# Patient Record
Sex: FEMALE | Race: OTHER | HISPANIC OR LATINO | Employment: STUDENT | URBAN - METROPOLITAN AREA
[De-identification: names, ages, dates, MRNs, and addresses within clinical notes are randomized per-mention and may not be internally consistent; named-entity substitution may affect disease eponyms.]

---
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
## 2022-02-01 DIAGNOSIS — F51.01 PRIMARY INSOMNIA: ICD-10-CM

## 2022-02-01 RX ORDER — TRAZODONE HYDROCHLORIDE 50 MG/1
TABLET ORAL
Qty: 90 TABLET | Refills: 0 | Status: SHIPPED | OUTPATIENT
Start: 2022-02-01

## 2022-03-07 DIAGNOSIS — F32.89 OTHER DEPRESSION: ICD-10-CM

## 2022-03-11 RX ORDER — BUPROPION HYDROCHLORIDE 150 MG/1
TABLET ORAL
Qty: 90 TABLET | Refills: 0 | Status: SHIPPED | OUTPATIENT
Start: 2022-03-11

## 2022-03-11 NOTE — TELEPHONE ENCOUNTER
anuel chavez showed for appt 3/10/22, LM for pt to call office back to schedule follow up appt  Pt last seen 12/10/21  Insurance is requiring a #90 day script  I sent #90 with no refills

## 2022-05-22 ENCOUNTER — HOSPITAL ENCOUNTER (EMERGENCY)
Facility: HOSPITAL | Age: 22
Discharge: HOME | End: 2022-05-22
Attending: EMERGENCY MEDICINE
Payer: COMMERCIAL

## 2022-05-22 VITALS
SYSTOLIC BLOOD PRESSURE: 122 MMHG | TEMPERATURE: 98.1 F | HEART RATE: 80 BPM | RESPIRATION RATE: 16 BRPM | WEIGHT: 154.5 LBS | OXYGEN SATURATION: 100 % | BODY MASS INDEX: 26.52 KG/M2 | DIASTOLIC BLOOD PRESSURE: 72 MMHG

## 2022-05-22 DIAGNOSIS — J02.9 PHARYNGITIS, UNSPECIFIED ETIOLOGY: Primary | ICD-10-CM

## 2022-05-22 LAB
FLUAV RNA RESP QL NAA+PROBE: NEGATIVE
FLUBV RNA RESP QL NAA+PROBE: NEGATIVE
RSV RNA RESP QL NAA+PROBE: NEGATIVE
S PYO DNA THROAT QL NAA+PROBE: NOT DETECTED
SARS-COV-2 RNA RESP QL NAA+PROBE: POSITIVE

## 2022-05-22 PROCEDURE — 99283 EMERGENCY DEPT VISIT LOW MDM: CPT

## 2022-05-22 PROCEDURE — 0241U HB NFCT DS VIR RESP RNA 4 TRGT: CPT | Performed by: PHYSICIAN ASSISTANT

## 2022-05-22 PROCEDURE — 87651 STREP A DNA AMP PROBE: CPT | Performed by: PHYSICIAN ASSISTANT

## 2022-05-22 PROCEDURE — 99284 EMERGENCY DEPT VISIT MOD MDM: CPT | Performed by: EMERGENCY MEDICINE

## 2022-05-22 RX ORDER — AMOXICILLIN 500 MG/1
500 CAPSULE ORAL EVERY 12 HOURS SCHEDULED
Qty: 20 CAPSULE | Refills: 0 | Status: SHIPPED | OUTPATIENT
Start: 2022-05-22 | End: 2022-06-01

## 2022-05-22 RX ORDER — ACETAMINOPHEN 325 MG/1
650 TABLET ORAL ONCE
Status: COMPLETED | OUTPATIENT
Start: 2022-05-22 | End: 2022-05-22

## 2022-05-22 RX ADMIN — DEXAMETHASONE SODIUM PHOSPHATE 10 MG: 10 INJECTION, SOLUTION INTRAMUSCULAR; INTRAVENOUS at 20:46

## 2022-05-22 RX ADMIN — ACETAMINOPHEN 650 MG: 325 TABLET ORAL at 20:45

## 2022-05-22 NOTE — Clinical Note
Joanie Madrigal was seen and treated in our emergency department on 5/22/2022  Diagnosis:     Oral Kerr  may return to school on return date, may return to work on return date  She may return on this date: 05/24/2022         If you have any questions or concerns, please don't hesitate to call        Rich Correa RN    ______________________________           _______________          _______________  [facility] Representative                              Date                                Time

## 2022-05-23 ENCOUNTER — TELEPHONE (OUTPATIENT)
Dept: EMERGENCY DEPT | Facility: HOSPITAL | Age: 22
End: 2022-05-23

## 2022-05-23 ENCOUNTER — TELEPHONE (OUTPATIENT)
Dept: OTHER | Facility: HOSPITAL | Age: 22
End: 2022-05-23

## 2022-05-23 NOTE — RESULT ENCOUNTER NOTE
Patient aware of positive covid results  Discussed 5 days isolation  During call patient hung up or disconnected, returned call with no answer  Left message to return call with any questions

## 2022-05-24 NOTE — ED PROVIDER NOTES
HPI: Patient is a 25 y o  female who presents with 2 days of cough and sore throat which the patient describes at mild The patient has had contact with people with similar symptoms  The patient has not taken any medication  No Known Allergies    Past Medical History:   Diagnosis Date    Anemia     Asthma       History reviewed  No pertinent surgical history  Social History     Tobacco Use    Smoking status: Never Smoker    Smokeless tobacco: Never Used   Vaping Use    Vaping Use: Never used   Substance Use Topics    Alcohol use: No    Drug use: No       Nursing notes reviewed  Physical Exam:  ED Triage Vitals [05/22/22 2040]   Temperature Pulse Respirations Blood Pressure SpO2   98 1 °F (36 7 °C) 80 16 122/72 100 %      Temp Source Heart Rate Source Patient Position - Orthostatic VS BP Location FiO2 (%)   Oral Monitor Sitting Left arm --      Pain Score       8           ROS: Positive for cough and sore throat ,the remainder of a 10 organ system ROS was otherwise unremarkable  General: awake, alert, no acute distress    Head: normocephalic, atraumatic    Eyes: no scleral icterus  Ears: external ears normal, hearing grossly intact  Nose: external exam grossly normal, positive nasal discharge  Neck: symmetric, No JVD noted, trachea midline  Pulmonary: no respiratory distress, no tachypnea noted  Cardiovascular: appears well perfused  Abdomen: no distention noted  Musculoskeletal: no deformities noted, tone normal  Neuro: grossly non-focal  Psych: mood and affect appropriate    The patient is stable and has a history and physical exam consistent with a viral illness  COVID19 testing has been performed  I considered the patient's other medical conditions as applicable/noted above in my medical decision making  The patient is stable upon discharge  The plan is for supportive care at home      The patient (and any family present) verbalized understanding of the discharge instructions and warnings that would necessitate return to the Emergency Department  All questions were answered prior to discharge  Pt re-examined and evaluated after testing and treatment  Spoke with the patient and feeling improved and sxs have resolved  Will discharge home with close f/u with pcp and instructed to return to the ED if sxs worsen or continue  Pt agrees with the plan for discharge and feels comfortable to go home with proper f/u  Advised to return for worsening or additional problems  Diagnostic tests were reviewed and questions answered  Diagnosis, care plan and treatment options were discussed  The patient understand instructions and will follow up as directed  Counseling: I had a detailed discussion with the patient and/or guardian regarding: the historical points, exam findings, and any diagnostic results supporting the discharge diagnosis, lab results, radiology results, discharge instructions reviewed with patient and/or family/caregiver and understanding was verbalized  Instructions given to return to the emergency department if symptoms worsen or persist, or if there are any questions or concerns that arise at home  All labs reviewed and utilized in the medical decision making process    All radiology studies independently viewed by me and interpreted by the radiologist       Medications   acetaminophen (TYLENOL) tablet 650 mg (650 mg Oral Given 5/22/22 2045)   dexamethasone oral liquid 10 mg 1 mL (10 mg Oral Given 5/22/22 2046)     Final diagnoses:   Pharyngitis, unspecified etiology     Time reflects when diagnosis was documented in both MDM as applicable and the Disposition within this note     Time User Action Codes Description Comment    5/22/2022  8:50 PM Law Mendoza Add [J02 9] Pharyngitis, unspecified etiology       ED Disposition     ED Disposition   Discharge    Condition   Stable    Date/Time   Sun May 22, 2022  8:50 PM    [address] discharge to home/self care                 Follow-up Information     Follow up With Specialties Details Why Contact Info    Shade Castro MD Family Medicine Schedule an appointment as soon as possible for a visit  If symptoms worsen [id number] [place] [id number] [phone]          Discharge Medication List as of 5/22/2022  8:54 PM      START taking these medications    Details   amoxicillin (AMOXIL) 500 mg capsule Take 1 capsule (500 mg total) by mouth every 12 (twelve) hours for 10 days, Starting Sun 5/22/2022, Until Wed 6/1/2022, Print         CONTINUE these medications which have NOT CHANGED    Details   Albuterol Sulfate (ProAir RespiClick) 686 (90 Base) MCG/ACT AEPB Inhale 2 puffs 4 (four) times a day as needed (SOB), Starting Fri 2/5/2021, Normal      buPROPion (WELLBUTRIN XL) 150 mg 24 hr tablet TAKE 1 TABLET BY MOUTH EVERY DAY IN THE MORNING, Normal      cetirizine (ZyrTEC) 10 mg tablet Starting Fri 4/29/2016, Historical Med      clindamycin-benzoyl peroxide (BENZACLIN) gel APPLY TO BACK , SHOULDERS AND CHEST ONCE DAILY, Historical Med      ipratropium-albuterol (DUO-NEB) 0 5-2 5 mg/3 mL nebulizer solution Every 6 hours, Starting Fri 12/30/2016, Historical Med      ketoconazole (NIZORAL) 2 % shampoo APPLY TO SCALP AT LEAST THREE TIMES WEEKLY FOR 5 MINUTES THEN RINSE OFF , Historical Med      traZODone (DESYREL) 50 mg tablet TAKE 1 TABLET BY MOUTH DAILY AT BEDTIME, Normal      Ventolin  (90 Base) MCG/ACT inhaler INHALE 2 PUFFS EVERY 4 TO 6 HOURS AS NEEDED FOR WHEEZING, Normal           No discharge procedures on file      Electronically Signed by        Lee Ochoa DO  05/24/22 5151

## 2022-07-29 ENCOUNTER — APPOINTMENT (OUTPATIENT)
Dept: LAB | Age: 22
End: 2022-07-29
Payer: COMMERCIAL

## 2022-07-29 DIAGNOSIS — R79.89 ELEVATED LFTS: ICD-10-CM

## 2022-07-29 DIAGNOSIS — E53.8 VITAMIN B12 DEFICIENCY: ICD-10-CM

## 2022-07-29 DIAGNOSIS — D50.9 IRON DEFICIENCY ANEMIA, UNSPECIFIED IRON DEFICIENCY ANEMIA TYPE: ICD-10-CM

## 2022-07-29 DIAGNOSIS — E55.9 VITAMIN D INSUFFICIENCY: ICD-10-CM

## 2022-07-29 DIAGNOSIS — L65.9 HAIR LOSS: ICD-10-CM

## 2022-07-29 DIAGNOSIS — F32.89 OTHER DEPRESSION: ICD-10-CM

## 2022-07-29 LAB
25(OH)D3 SERPL-MCNC: 21.3 NG/ML (ref 30–100)
ALBUMIN SERPL BCP-MCNC: 3.7 G/DL (ref 3.5–5)
ALP SERPL-CCNC: 63 U/L (ref 46–384)
ALT SERPL W P-5'-P-CCNC: 26 U/L (ref 12–78)
ANION GAP SERPL CALCULATED.3IONS-SCNC: 5 MMOL/L (ref 4–13)
AST SERPL W P-5'-P-CCNC: 18 U/L (ref 5–45)
BASOPHILS # BLD AUTO: 0.05 THOUSANDS/ΜL (ref 0–0.1)
BASOPHILS NFR BLD AUTO: 1 % (ref 0–1)
BILIRUB SERPL-MCNC: 0.99 MG/DL (ref 0.2–1)
BUN SERPL-MCNC: 13 MG/DL (ref 5–25)
CALCIUM SERPL-MCNC: 9.3 MG/DL (ref 8.3–10.1)
CHLORIDE SERPL-SCNC: 107 MMOL/L (ref 96–108)
CO2 SERPL-SCNC: 27 MMOL/L (ref 21–32)
CREAT SERPL-MCNC: 0.82 MG/DL (ref 0.6–1.3)
EOSINOPHIL # BLD AUTO: 0.21 THOUSAND/ΜL (ref 0–0.61)
EOSINOPHIL NFR BLD AUTO: 3 % (ref 0–6)
ERYTHROCYTE [DISTWIDTH] IN BLOOD BY AUTOMATED COUNT: 15.7 % (ref 11.6–15.1)
FERRITIN SERPL-MCNC: 129 NG/ML (ref 8–388)
GFR SERPL CREATININE-BSD FRML MDRD: 104 ML/MIN/1.73SQ M
GLUCOSE SERPL-MCNC: 83 MG/DL (ref 65–140)
HCT VFR BLD AUTO: 36 % (ref 34.8–46.1)
HGB BLD-MCNC: 11.5 G/DL (ref 11.5–15.4)
IMM GRANULOCYTES # BLD AUTO: 0.03 THOUSAND/UL (ref 0–0.2)
IMM GRANULOCYTES NFR BLD AUTO: 0 % (ref 0–2)
IRON SATN MFR SERPL: 55 % (ref 15–50)
IRON SERPL-MCNC: 144 UG/DL (ref 50–170)
LYMPHOCYTES # BLD AUTO: 2.06 THOUSANDS/ΜL (ref 0.6–4.47)
LYMPHOCYTES NFR BLD AUTO: 29 % (ref 14–44)
MCH RBC QN AUTO: 23.9 PG (ref 26.8–34.3)
MCHC RBC AUTO-ENTMCNC: 31.9 G/DL (ref 31.4–37.4)
MCV RBC AUTO: 75 FL (ref 82–98)
MONOCYTES # BLD AUTO: 0.63 THOUSAND/ΜL (ref 0.17–1.22)
MONOCYTES NFR BLD AUTO: 9 % (ref 4–12)
NEUTROPHILS # BLD AUTO: 4.21 THOUSANDS/ΜL (ref 1.85–7.62)
NEUTS SEG NFR BLD AUTO: 58 % (ref 43–75)
NRBC BLD AUTO-RTO: 0 /100 WBCS
PLATELET # BLD AUTO: 428 THOUSANDS/UL (ref 149–390)
PMV BLD AUTO: 9.9 FL (ref 8.9–12.7)
POTASSIUM SERPL-SCNC: 4.2 MMOL/L (ref 3.5–5.3)
PROT SERPL-MCNC: 7.3 G/DL (ref 6.4–8.4)
RBC # BLD AUTO: 4.81 MILLION/UL (ref 3.81–5.12)
SODIUM SERPL-SCNC: 139 MMOL/L (ref 135–147)
TIBC SERPL-MCNC: 261 UG/DL (ref 250–450)
TSH SERPL DL<=0.05 MIU/L-ACNC: 1.75 UIU/ML (ref 0.45–4.5)
WBC # BLD AUTO: 7.19 THOUSAND/UL (ref 4.31–10.16)

## 2022-07-29 PROCEDURE — 84443 ASSAY THYROID STIM HORMONE: CPT

## 2022-07-29 PROCEDURE — 83540 ASSAY OF IRON: CPT

## 2022-07-29 PROCEDURE — 83550 IRON BINDING TEST: CPT

## 2022-07-29 PROCEDURE — 82306 VITAMIN D 25 HYDROXY: CPT

## 2022-07-29 PROCEDURE — 82607 VITAMIN B-12: CPT

## 2022-07-29 PROCEDURE — 85025 COMPLETE CBC W/AUTO DIFF WBC: CPT

## 2022-07-29 PROCEDURE — 82728 ASSAY OF FERRITIN: CPT

## 2022-07-29 PROCEDURE — 36415 COLL VENOUS BLD VENIPUNCTURE: CPT

## 2022-07-29 PROCEDURE — 80053 COMPREHEN METABOLIC PANEL: CPT

## 2022-07-30 LAB — VIT B12 SERPL-MCNC: 596 PG/ML (ref 100–900)

## 2022-08-18 ENCOUNTER — OFFICE VISIT (OUTPATIENT)
Dept: OBGYN CLINIC | Facility: CLINIC | Age: 22
End: 2022-08-18
Payer: COMMERCIAL

## 2022-08-18 VITALS
SYSTOLIC BLOOD PRESSURE: 118 MMHG | WEIGHT: 151.4 LBS | DIASTOLIC BLOOD PRESSURE: 62 MMHG | BODY MASS INDEX: 25.85 KG/M2 | HEIGHT: 64 IN

## 2022-08-18 DIAGNOSIS — B35.4 TINEA CORPORIS: Primary | ICD-10-CM

## 2022-08-18 DIAGNOSIS — B35.6 TINEA OF GROIN: ICD-10-CM

## 2022-08-18 PROCEDURE — 99203 OFFICE O/P NEW LOW 30 MIN: CPT | Performed by: OBSTETRICS & GYNECOLOGY

## 2022-08-18 RX ORDER — KETOCONAZOLE 20 MG/G
CREAM TOPICAL DAILY
Qty: 60 G | Refills: 0 | Status: SHIPPED | OUTPATIENT
Start: 2022-08-18 | End: 2022-09-15

## 2022-08-18 RX ORDER — ITRACONAZOLE 100 MG/1
200 CAPSULE ORAL DAILY
Qty: 56 CAPSULE | Refills: 0 | Status: SHIPPED | OUTPATIENT
Start: 2022-08-18 | End: 2022-09-15

## 2022-08-18 NOTE — PROGRESS NOTES
Gynecology   Edison Alvarez 25 y o  female MRN: [id number]    HPI:  Edison Alvarez is a 25 y o  female who presents with 3 week history of spreading bumpy itchy rash  Started after working out at [employer] in different clothing  Started in groin are and now spreading towards belly  Has not tried any OTC meds  Historical Information   Past Medical History:   Diagnosis Date    Anemia     Asthma      Past Surgical History:   Procedure Laterality Date    WISDOM TOOTH EXTRACTION       OB/GYN History:   Nulligravida  Sexually active  Condoms    Family History   Problem Relation Age of Onset    No Known Problems Mother     No Known Problems Father     Breast cancer Maternal Grandmother     Thyroid disease Maternal Grandmother     Diabetes Paternal Grandmother      Social History   Social History     Substance and Sexual Activity   Alcohol Use No     Social History     Substance and Sexual Activity   Drug Use No     Social History     Tobacco Use   Smoking Status Never Smoker   Smokeless Tobacco Never Used     E-Cigarette/Vaping    E-Cigarette Use Never User      E-Cigarette/Vaping Substances    Nicotine No     THC No     CBD No     Flavoring No     Other No     Unknown No        Meds/Allergies   Current Outpatient Medications on File Prior to Visit   Medication Sig    Albuterol Sulfate (ProAir RespiClick) 925 (90 Base) MCG/ACT AEPB Inhale 2 puffs 4 (four) times a day as needed (SOB)    buPROPion (WELLBUTRIN XL) 150 mg 24 hr tablet TAKE 1 TABLET BY MOUTH EVERY DAY IN THE MORNING    cetirizine (ZyrTEC) 10 mg tablet     ipratropium-albuterol (DUO-NEB) 0 5-2 5 mg/3 mL nebulizer solution Every 6 hours    ketoconazole (NIZORAL) 2 % shampoo APPLY TO SCALP AT LEAST THREE TIMES WEEKLY FOR 5 MINUTES THEN RINSE OFF      Ventolin  (90 Base) MCG/ACT inhaler INHALE 2 PUFFS EVERY 4 TO 6 HOURS AS NEEDED FOR WHEEZING    clindamycin-benzoyl peroxide (BENZACLIN) gel APPLY TO BACK , SHOULDERS AND CHEST ONCE DAILY (Patient not taking: Reported on 8/18/2022)    traZODone (DESYREL) 50 mg tablet TAKE 1 TABLET BY MOUTH DAILY AT BEDTIME (Patient not taking: Reported on 8/18/2022)     No current facility-administered medications on file prior to visit  Allergies   Allergen Reactions    Dog Epithelium Itching    Grass Pollen(K-O-R-T-Swt Garret) Itching       Objective   Vitals: Blood pressure 118/62, height 5' 4" (1 626 m), weight 68 7 kg (151 lb 6 4 oz), last menstrual period 08/10/2022  Physical Exam  Constitutional:       Appearance: Normal appearance  HENT:      Head: Normocephalic  Cardiovascular:      Rate and Rhythm: Normal rate and regular rhythm  Pulmonary:      Effort: Pulmonary effort is normal    Abdominal:      Palpations: Abdomen is soft  Tenderness: There is no abdominal tenderness  Musculoskeletal:         General: No swelling  Neurological:      General: No focal deficit present  Mental Status: She is alert and oriented to person, place, and time  Skin:     General: Skin is warm  Coloration: Skin is not pale  Findings: Rash present  Rash is macular and scaling  Rash is not crusting  Comments: Diffuse tinea with thickening of groin folds; extends from perineum to iliac crests bilaterally   Psychiatric:         Mood and Affect: Mood normal          Behavior: Behavior normal    Vitals reviewed  Impression/Plan:    1  Tinea corporis  - itraconazole (SPORANOX) 100 mg capsule; Take 2 capsules (200 mg total) by mouth daily for 28 days  Dispense: 56 capsule; Refill: 0  - ketoconazole (NIZORAL) 2 % cream; Apply topically daily for 28 days  Dispense: 60 g; Refill: 0    2  Tinea of groin  - itraconazole (SPORANOX) 100 mg capsule; Take 2 capsules (200 mg total) by mouth daily for 28 days  Dispense: 56 capsule;  Refill: 0  - ketoconazole (NIZORAL) 2 % cream; Apply topically daily for 28 days  Dispense: 60 g; Refill: 0    Follow up with annual exam

## 2022-09-02 ENCOUNTER — TELEPHONE (OUTPATIENT)
Dept: FAMILY MEDICINE CLINIC | Facility: CLINIC | Age: 22
End: 2022-09-02

## 2022-09-02 NOTE — TELEPHONE ENCOUNTER
----- Message from Xavi Euceda MD sent at 9/1/2022 10:44 AM EDT -----    Blood work showed low vitamin-D  Please make sure she is taking vitamin-D 2000 unit daily  She is due for  an appointment

## 2022-09-02 NOTE — TELEPHONE ENCOUNTER
I lm for pt to call for results or I did send a message through her my chart   She is to call with questions

## 2022-10-12 PROBLEM — Z00.129 ENCOUNTER FOR ROUTINE CHILD HEALTH EXAMINATION WITHOUT ABNORMAL FINDINGS: Status: RESOLVED | Noted: 2021-02-05 | Resolved: 2022-10-12

## 2022-10-12 PROBLEM — Z00.00 HEALTHCARE MAINTENANCE: Status: RESOLVED | Noted: 2020-07-30 | Resolved: 2022-10-12

## 2022-10-12 PROBLEM — J03.90 TONSILLITIS: Status: RESOLVED | Noted: 2020-12-29 | Resolved: 2022-10-12

## 2023-01-11 DIAGNOSIS — F32.89 OTHER DEPRESSION: ICD-10-CM

## 2023-01-11 RX ORDER — BUPROPION HYDROCHLORIDE 150 MG/1
150 TABLET ORAL EVERY MORNING
Qty: 30 TABLET | Refills: 0 | Status: CANCELLED | OUTPATIENT
Start: 2023-01-11

## 2023-01-12 DIAGNOSIS — F32.89 OTHER DEPRESSION: ICD-10-CM

## 2023-01-12 NOTE — TELEPHONE ENCOUNTER
[id number] pharmacy needs new script and quantity 90 for 3mo supply

## 2023-01-13 RX ORDER — BUPROPION HYDROCHLORIDE 150 MG/1
150 TABLET ORAL EVERY MORNING
Qty: 90 TABLET | Refills: 0 | OUTPATIENT
Start: 2023-01-13

## 2023-01-17 ENCOUNTER — OFFICE VISIT (OUTPATIENT)
Dept: URGENT CARE | Age: 23
End: 2023-01-17

## 2023-01-17 VITALS
HEART RATE: 78 BPM | OXYGEN SATURATION: 99 % | RESPIRATION RATE: 18 BRPM | TEMPERATURE: 98.5 F | DIASTOLIC BLOOD PRESSURE: 58 MMHG | SYSTOLIC BLOOD PRESSURE: 116 MMHG

## 2023-01-17 DIAGNOSIS — H00.011 HORDEOLUM EXTERNUM OF RIGHT UPPER EYELID: ICD-10-CM

## 2023-01-17 DIAGNOSIS — H02.843 SWELLING OF RIGHT EYELID: Primary | ICD-10-CM

## 2023-01-17 RX ORDER — ERYTHROMYCIN 5 MG/G
0.5 OINTMENT OPHTHALMIC EVERY 6 HOURS SCHEDULED
Qty: 28 G | Refills: 0 | Status: SHIPPED | OUTPATIENT
Start: 2023-01-17 | End: 2023-01-24

## 2023-01-17 NOTE — PROGRESS NOTES
[facility]        NAME: Anita James is a 23 y o  female  : 2000    MRN: [id number]  DATE: 2023  TIME: 1:24 PM    Assessment and Plan   Swelling of right eyelid [H02 843]  1  Swelling of right eyelid  erythromycin (ILOTYCIN) ophthalmic ointment      2  Hordeolum externum of right upper eyelid              Patient Instructions     Erythromycin ointment as directed  Follow up with PCP in 2-3 days  Proceed to ER if symptoms worsen  Chief Complaint     Chief Complaint   Patient presents with   • Eyelid Pain     Pt presents for right eyelid redness, swelling and painful    Started yesterday  Applying warm compress  History of Present Illness     23 y o  F  R eye pain, swelling & redness x 1 day  Denies trauma or injury  No visual disturbance  No contact lenses  No discharge or itching    Review of Systems   Review of Systems   Constitutional: Negative for chills, fatigue and fever  HENT: Negative for congestion, ear pain, facial swelling, hearing loss, rhinorrhea, sinus pressure, sneezing, sore throat and trouble swallowing  Eyes: Positive for redness  Negative for pain and visual disturbance  Respiratory: Negative for cough, chest tightness, shortness of breath and wheezing  Cardiovascular: Negative for chest pain and palpitations  Gastrointestinal: Negative for abdominal pain, diarrhea, nausea and vomiting  Genitourinary: Negative for dysuria, flank pain, hematuria and pelvic pain  Musculoskeletal: Negative for arthralgias, back pain and myalgias  Skin: Negative for color change and rash  Neurological: Negative for dizziness, seizures, syncope, weakness, light-headedness and headaches  Psychiatric/Behavioral: Negative for confusion, hallucinations and sleep disturbance  The patient is not nervous/anxious  All other systems reviewed and are negative          Current Medications       Current Outpatient Medications:   •  buPROPion (WELLBUTRIN XL) 150 mg 24 hr tablet, TAKE 1 TABLET BY MOUTH EVERY DAY IN THE MORNING, Disp: 90 tablet, Rfl: 0  •  cetirizine (ZyrTEC) 10 mg tablet, , Disp: , Rfl:   •  erythromycin (ILOTYCIN) ophthalmic ointment, Administer 0 5 inches to the right eye every 6 (six) hours for 7 days, Disp: 28 g, Rfl: 0  •  ipratropium-albuterol (DUO-NEB) 0 5-2 5 mg/3 mL nebulizer solution, Every 6 hours, Disp: , Rfl:   •  Ventolin  (90 Base) MCG/ACT inhaler, INHALE 2 PUFFS EVERY 4 TO 6 HOURS AS NEEDED FOR WHEEZING, Disp: 8 Inhaler, Rfl: 1  •  Albuterol Sulfate (ProAir RespiClick) 988 (90 Base) MCG/ACT AEPB, Inhale 2 puffs 4 (four) times a day as needed (SOB), Disp: 1 each, Rfl: 2  •  clindamycin-benzoyl peroxide (BENZACLIN) gel, APPLY TO BACK , SHOULDERS AND CHEST ONCE DAILY (Patient not taking: Reported on 8/18/2022), Disp: , Rfl:   •  ketoconazole (NIZORAL) 2 % cream, Apply topically daily for 28 days, Disp: 60 g, Rfl: 0  •  ketoconazole (NIZORAL) 2 % shampoo, APPLY TO SCALP AT LEAST THREE TIMES WEEKLY FOR 5 MINUTES THEN RINSE OFF , Disp: , Rfl:   •  traZODone (DESYREL) 50 mg tablet, TAKE 1 TABLET BY MOUTH DAILY AT BEDTIME (Patient not taking: Reported on 8/18/2022), Disp: 90 tablet, Rfl: 0    Current Allergies     Allergies as of 01/17/2023 - Reviewed 01/17/2023   Allergen Reaction Noted   • Dog epithelium Itching 08/18/2022   • Grass pollen(k-o-r-t-swt vinod) Itching 08/18/2022            The following portions of the patient's history were reviewed and updated as appropriate: allergies, current medications, past family history, past medical history, past social history, past surgical history and problem list      Past Medical History:   Diagnosis Date   • Anemia    • Asthma        Past Surgical History:   Procedure Laterality Date   • WISDOM TOOTH EXTRACTION         Family History   Problem Relation Age of Onset   • No Known Problems Mother    • No Known Problems Father    • Breast cancer Maternal Grandmother    • Thyroid disease Maternal Grandmother    • Diabetes Paternal Grandmother          Medications have been verified  Objective   /58   Pulse 78   Temp 98 5 °F (36 9 °C) (Tympanic)   Resp 18   SpO2 99%   No LMP recorded  Physical Exam     Physical Exam  Vitals reviewed  Constitutional:       General: She is not in acute distress  Appearance: Normal appearance  She is not toxic-appearing  HENT:      Head: Normocephalic  Right Ear: Tympanic membrane normal  Tympanic membrane is not erythematous or bulging  Left Ear: Tympanic membrane normal  Tympanic membrane is not erythematous or bulging  Nose: No congestion or rhinorrhea  Right Sinus: No maxillary sinus tenderness or frontal sinus tenderness  Left Sinus: No maxillary sinus tenderness or frontal sinus tenderness  Mouth/Throat:      Pharynx: Uvula midline  No oropharyngeal exudate, posterior oropharyngeal erythema or uvula swelling  Tonsils: No tonsillar exudate or tonsillar abscesses  Eyes:      General:         Right eye: Hordeolum (stye to R upper eyelid; +redness & swelling) present  No foreign body or discharge  Extraocular Movements: Extraocular movements intact  Conjunctiva/sclera: Conjunctivae normal       Right eye: Right conjunctiva is not injected  No hemorrhage  Pupils: Pupils are equal, round, and reactive to light  Cardiovascular:      Rate and Rhythm: Normal rate and regular rhythm  Pulses: Normal pulses  Heart sounds: Normal heart sounds  Pulmonary:      Effort: Pulmonary effort is normal  No tachypnea or respiratory distress  Breath sounds: Normal breath sounds and air entry  No decreased breath sounds, wheezing, rhonchi or rales  Abdominal:      General: Bowel sounds are normal       Palpations: Abdomen is soft  Tenderness: There is no abdominal tenderness  There is no right CVA tenderness or guarding  Musculoskeletal:         General: Normal range of motion  Cervical back: Normal range of motion  Lymphadenopathy:      Cervical: No cervical adenopathy  Skin:     General: Skin is warm and dry  Neurological:      General: No focal deficit present  Mental Status: She is alert  Sensory: Sensation is intact  Motor: Motor function is intact  Coordination: Coordination is intact  Gait: Gait is intact  Deep Tendon Reflexes: Reflexes are normal and symmetric

## 2023-01-24 DIAGNOSIS — F32.89 OTHER DEPRESSION: ICD-10-CM

## 2023-01-24 RX ORDER — BUPROPION HYDROCHLORIDE 150 MG/1
150 TABLET ORAL EVERY MORNING
Qty: 90 TABLET | Refills: 0 | Status: SHIPPED | OUTPATIENT
Start: 2023-01-24

## 2023-02-06 ENCOUNTER — TELEPHONE (OUTPATIENT)
Dept: OBGYN CLINIC | Facility: CLINIC | Age: 23
End: 2023-02-06

## 2023-02-06 NOTE — TELEPHONE ENCOUNTER
Patient having intermittent llq pain  States tolerable but has not felt this before  Menses are regular, LMP 1/20/23  Explained mid cycle, could be mittleschmerz/ovarian cyst  Advised Motrin for now  If pain increases to call back  Offered earlier appt/outpatient US but patient states she can wait until Annual exam on Wednesday  Rtd to [facility] as FYI

## 2023-02-06 NOTE — TELEPHONE ENCOUNTER
Pt is noticing sharp pains off and on on her left lower side  She has her annual on Wednesday w/Dr Fuentes Hensley ; was offered an appt for Tuesday morning but can't come then  Please advise

## 2023-02-08 ENCOUNTER — ANNUAL EXAM (OUTPATIENT)
Dept: OBGYN CLINIC | Facility: CLINIC | Age: 23
End: 2023-02-08

## 2023-02-08 ENCOUNTER — TELEPHONE (OUTPATIENT)
Dept: OBGYN CLINIC | Facility: CLINIC | Age: 23
End: 2023-02-08

## 2023-02-08 VITALS
DIASTOLIC BLOOD PRESSURE: 62 MMHG | HEIGHT: 64 IN | SYSTOLIC BLOOD PRESSURE: 122 MMHG | WEIGHT: 156 LBS | BODY MASS INDEX: 26.63 KG/M2

## 2023-02-08 DIAGNOSIS — Z01.419 ENCOUNTER FOR ANNUAL ROUTINE GYNECOLOGICAL EXAMINATION: Primary | ICD-10-CM

## 2023-02-08 DIAGNOSIS — Z30.015 ENCOUNTER FOR INITIAL PRESCRIPTION OF VAGINAL RING HORMONAL CONTRACEPTIVE: ICD-10-CM

## 2023-02-08 RX ORDER — ETONOGESTREL AND ETHINYL ESTRADIOL 11.7; 2.7 MG/1; MG/1
INSERT, EXTENDED RELEASE VAGINAL
Qty: 3 EACH | Refills: 3 | Status: SHIPPED | OUTPATIENT
Start: 2023-02-08 | End: 2023-02-10

## 2023-02-08 NOTE — PROGRESS NOTES
Assessment/Plan:    1  Encounter for annual routine gynecological examination      2  Encounter for initial prescription of vaginal ring hormonal contraceptive    - etonogestrel-ethinyl estradiol (NuvaRing) 0 12-0 015 MG/24HR vaginal ring; Insert vaginally and leave in place for 3 consecutive weeks, then remove for 1 week  Dispense: 3 each; Refill: 3        Subjective      Kalia Katlyn is a 23 y o  female who presents for annual exam  Periods are regular every 30 days, lasting 4-7 days  Dysmenorrhea:severe, occurring first 1-2 days of flow  Cyclic symptoms:  none  No intermenstrual bleeding, spotting, or discharge  The patient would like to start birth control  Current contraception: none  History of abnormal Pap smear: no  Regular self breast exam: yes  History of abnormal mammogram: no  History of abnormal lipids: no    Menstrual History:  Nulligravida  Patient's last menstrual period was 01/20/2023 (exact date)  Period Cycle (Days): 30  Period Duration (Days): 4-7  Period Pattern: Regular  Menstrual Flow: Heavy, Light (2 days heavy)  Menstrual Control: Tampon, Maxi pad  Menstrual Control Change Freq (Hours): 5  Dysmenorrhea: (!) Severe  Dysmenorrhea Symptoms: Cramping, Headache, Diarrhea (Breast pain)    Past Medical History:   Diagnosis Date   • Anemia    • Asthma        Family History   Problem Relation Age of Onset   • No Known Problems Mother    • No Known Problems Father    • Breast cancer Maternal Grandmother    • Thyroid disease Maternal Grandmother    • Diabetes Paternal Grandmother        The following portions of the patient's history were reviewed and updated as appropriate: allergies, current medications, past family history, past medical history, past social history, past surgical history and problem list     Review of Systems  Pertinent items are noted in HPI        Objective      /62 (BP Location: Right arm, Patient Position: Sitting, Cuff Size: Large)   Ht 5' 4" (1 626 m)   Wt 70 8 kg (156 lb)   LMP 01/20/2023 (Exact Date)   BMI 26 78 kg/m²     General:   alert and oriented, in no acute distress, appears stated age and cooperative   Heart:  Breasts: regular rate and rhythm   appear normal, no suspicious masses, no skin or nipple changes or axillary nodes     Lungs: effort normal   Abdomen: soft, non-tender, without masses or organomegaly   Vulva: normal   Vagina: normal mucosa   Cervix: no lesions   Uterus: normal size, mobile, non-tender   Adnexa: normal adnexa and no mass, fullness, tenderness

## 2023-02-08 NOTE — TELEPHONE ENCOUNTER
Patient was seen today and was prescribed birth control  Patient stated it is not covered by her insurance can another birth control be prescribed

## 2023-02-10 RX ORDER — ETONOGESTREL AND ETHINYL ESTRADIOL 11.7; 2.7 MG/1; MG/1
INSERT, EXTENDED RELEASE VAGINAL
Qty: 3 EACH | Refills: 3 | Status: SHIPPED | OUTPATIENT
Start: 2023-02-10

## 2023-03-06 ENCOUNTER — OFFICE VISIT (OUTPATIENT)
Dept: FAMILY MEDICINE CLINIC | Facility: CLINIC | Age: 23
End: 2023-03-06

## 2023-03-06 VITALS
BODY MASS INDEX: 26.8 KG/M2 | OXYGEN SATURATION: 99 % | DIASTOLIC BLOOD PRESSURE: 76 MMHG | TEMPERATURE: 98.2 F | SYSTOLIC BLOOD PRESSURE: 128 MMHG | WEIGHT: 157 LBS | RESPIRATION RATE: 16 BRPM | HEIGHT: 64 IN | HEART RATE: 77 BPM

## 2023-03-06 DIAGNOSIS — F32.89 OTHER DEPRESSION: ICD-10-CM

## 2023-03-06 DIAGNOSIS — L65.9 HAIR LOSS: Primary | ICD-10-CM

## 2023-03-06 DIAGNOSIS — E55.9 VITAMIN D INSUFFICIENCY: ICD-10-CM

## 2023-03-06 DIAGNOSIS — Z00.00 WELLNESS EXAMINATION: ICD-10-CM

## 2023-03-06 DIAGNOSIS — J45.20 MILD INTERMITTENT ASTHMA WITHOUT COMPLICATION: ICD-10-CM

## 2023-03-06 DIAGNOSIS — D50.8 OTHER IRON DEFICIENCY ANEMIA: ICD-10-CM

## 2023-03-06 RX ORDER — BUPROPION HYDROCHLORIDE 300 MG/1
300 TABLET ORAL EVERY MORNING
Qty: 90 TABLET | Refills: 1 | Status: SHIPPED | OUTPATIENT
Start: 2023-03-06

## 2023-03-06 NOTE — PROGRESS NOTES
Name: Jeanette Davis      : 2000      MRN: [id number]  Encounter Provider: Bernie Hardy MD  Encounter Date: 3/6/2023   Encounter department: [address] [place] [address]  Hair loss  Assessment & Plan:  I am going to check a blood work  Continue to follow-up with dermatology  Orders:  -     CBC and differential; Future  -     TSH, 3rd generation with Free T4 reflex; Future  -     Comprehensive metabolic panel; Future    2  Other depression  Assessment & Plan:  Not well controlled I am going to increase Wellbutrin to 300 mg  Discussed about possible side effect  She was told to come back in 4 to 6 weeks and I will consider adding another medication or switching her meds  Orders:  -     buPROPion (WELLBUTRIN XL) 300 mg 24 hr tablet; Take 1 tablet (300 mg total) by mouth every morning    3  Mild intermittent asthma without complication  Assessment & Plan:  Stable and well controlled with occasional Albuterol use  Continue same  4  Wellness examination  Assessment & Plan:  Discussed regular exercise, diet, and immunizations  5  Vitamin D insufficiency  Assessment & Plan:  Continue to take vitamin D  I will check vitamin D levels    Orders:  -     Vitamin D 25 hydroxy; Future    6  Other iron deficiency anemia  Assessment & Plan:  Last blood work was last year and it showed hemoglobin and iron improving  I will repeat her CBC and iron panel  We will continue to monitor  Orders:  -     CBC and differential; Future  -     TSH, 3rd generation with Free T4 reflex; Future  -     Iron Panel (Includes Ferritin, Iron Sat%, Iron, and TIBC); Future    7  BMI 26 0-26 9,adult           Subjective     Ree Diaz is a 23year old female with PMH of anemia, asthma, and depression, presenting for annual wellness exam  She reports that she is doing well over all, but is struggling with her depression   She feels that Wellbutrin isn't working and may want to try something different  She denies any fever, SOB, chest pain, or N/V/D/ Her asthma is well controlled with occasional PRN albuterol  She has no other complaints  Review of Systems   Constitutional: Negative for activity change, chills, fatigue and fever  Respiratory: Negative for shortness of breath  Cardiovascular: Negative for chest pain, palpitations and leg swelling  Gastrointestinal: Negative for abdominal pain  Musculoskeletal: Positive for back pain  Occasional     Neurological: Negative for dizziness and weakness  Psychiatric/Behavioral: Positive for sleep disturbance         Past Medical History:   Diagnosis Date   • Anemia    • Asthma      Past Surgical History:   Procedure Laterality Date   • WISDOM TOOTH EXTRACTION       Family History   Problem Relation Age of Onset   • No Known Problems Mother    • No Known Problems Father    • Breast cancer Maternal Grandmother    • Thyroid disease Maternal Grandmother    • Diabetes Paternal Grandmother      Social History     Socioeconomic History   • Marital status: Single     Spouse name: None   • Number of children: None   • Years of education: None   • Highest education level: None   Occupational History   • None   Tobacco Use   • Smoking status: Never   • Smokeless tobacco: Never   Vaping Use   • Vaping Use: Never used   Substance and Sexual Activity   • Alcohol use: No   • Drug use: No   • Sexual activity: Yes     Partners: Male   Other Topics Concern   • None   Social History Narrative   • None     Social Determinants of Health     Financial Resource Strain: Not on file   Food Insecurity: Not on file   Transportation Needs: Not on file   Physical Activity: Not on file   Stress: Not on file   Social Connections: Not on file   Intimate Partner Violence: Not on file   Housing Stability: Not on file     Current Outpatient Medications on File Prior to Visit   Medication Sig   • Albuterol Sulfate (ProAir RespiClick) 318 (90 Base) MCG/ACT AEPB Inhale 2 puffs 4 (four) times a day as needed (SOB)   • cetirizine (ZyrTEC) 10 mg tablet    • ipratropium-albuterol (DUO-NEB) 0 5-2 5 mg/3 mL nebulizer solution Every 6 hours   • ketoconazole (NIZORAL) 2 % shampoo APPLY TO SCALP AT LEAST THREE TIMES WEEKLY FOR 5 MINUTES THEN RINSE OFF  • Ventolin  (90 Base) MCG/ACT inhaler INHALE 2 PUFFS EVERY 4 TO 6 HOURS AS NEEDED FOR WHEEZING   • [DISCONTINUED] buPROPion (WELLBUTRIN XL) 150 mg 24 hr tablet Take 1 tablet (150 mg total) by mouth every morning   • ketoconazole (NIZORAL) 2 % cream Apply topically daily for 28 days (Patient not taking: Reported on 3/6/2023)   • [DISCONTINUED] etonogestrel-ethinyl estradiol (NuvaRing) 0 12-0 015 MG/24HR vaginal ring Insert vaginally and leave in place for 3 consecutive weeks, then remove for 1 week   (Patient not taking: Reported on 3/6/2023)     Allergies   Allergen Reactions   • Dog Epithelium Itching   • Grass Pollen(K-O-R-T-Swt Garret) Itching     Immunization History   Administered Date(s) Administered   • COVID-19 MODERNA VACC 0 5 ML IM 05/03/2021, 06/02/2021   • DTaP 01/10/2003, 03/07/2003, 05/05/2003, 02/09/2004, 11/15/2006   • HPV9 10/24/2019, 01/03/2020, 02/05/2021   • Hep A, ped/adol, 2 dose 10/24/2019, 04/27/2020   • Hep B, Adolescent or Pediatric 01/10/2003, 03/07/2003, 02/09/2004   • Hepatitis A 10/24/2019, 04/27/2020   • HiB 01/10/2003, 03/07/2003, 12/09/2003   • Hib (PRP-T) 01/10/2003   • INFLUENZA 12/03/2007, 01/08/2008, 01/24/2011, 03/14/2012, 11/19/2012   • IPV 01/10/2003, 03/07/2003, 12/09/2003, 11/15/2006   • Influenza Split High Dose Preservative Free IM 12/09/2003   • MMR 12/09/2003, 11/15/2006, 11/12/2015   • Meningococcal Conjugate (MCV4O) 12/29/2014, 11/21/2018   • Meningococcal MCV4P 11/21/2018   • Pneumococcal Conjugate 13-Valent 03/07/2003, 05/05/2003   • Pneumococcal Conjugate PCV 7 01/10/2003, 03/07/2003, 05/05/2003   • Pneumococcal Polysaccharide PPV23 01/10/2003   • Tdap 12/29/2014   • Tuberculin Skin Test-PPD Intradermal 11/21/2018   • Varicella 12/09/2003, 12/03/2007, 11/12/2015       Objective     /76 (BP Location: Left arm, Patient Position: Sitting, Cuff Size: Adult)   Pulse 77   Temp 98 2 °F (36 8 °C) (Tympanic)   Resp 16   Ht 5' 4" (1 626 m)   Wt 71 2 kg (157 lb)   SpO2 99%   BMI 26 95 kg/m²     Physical Exam  Vitals and nursing note reviewed  Constitutional:       Appearance: Normal appearance  She is normal weight  HENT:      Head: Normocephalic and atraumatic  Right Ear: Tympanic membrane normal       Left Ear: Tympanic membrane normal       Nose: Nose normal       Mouth/Throat:      Mouth: Mucous membranes are moist    Eyes:      Pupils: Pupils are equal, round, and reactive to light  Cardiovascular:      Rate and Rhythm: Normal rate and regular rhythm  Pulses: Normal pulses  Heart sounds: Normal heart sounds  Pulmonary:      Effort: Pulmonary effort is normal       Breath sounds: Normal breath sounds  Abdominal:      General: Abdomen is flat  Skin:     General: Skin is warm and dry  Capillary Refill: Capillary refill takes less than 2 seconds  Neurological:      General: No focal deficit present  Mental Status: She is alert     Psychiatric:         Mood and Affect: Mood normal        Leonor Lamb MD

## 2023-03-06 NOTE — PROGRESS NOTES
Name: Jae Morales      : 2000      MRN: [id number]  Encounter Provider: Edgar Stephens MD  Encounter Date: 3/6/2023   Encounter department: [address] [place] [address]  Hair loss  Assessment & Plan:  I am going to check a blood work  Continue to follow-up with dermatology  Orders:  -     CBC and differential; Future  -     TSH, 3rd generation with Free T4 reflex; Future  -     Comprehensive metabolic panel; Future    2  Other depression  Assessment & Plan:  Not well controlled I am going to increase Wellbutrin to 300 mg  Discussed about possible side effect  She was told to come back in 4 to 6 weeks and I will consider adding another medication or switching her meds  Orders:  -     buPROPion (WELLBUTRIN XL) 300 mg 24 hr tablet; Take 1 tablet (300 mg total) by mouth every morning    3  Mild intermittent asthma without complication  Assessment & Plan:  Stable and well controlled with occasional Albuterol use  Continue same  4  Wellness examination  Assessment & Plan:  Discussed regular exercise, diet, and immunizations  5  Vitamin D insufficiency  Assessment & Plan:  Continue to take vitamin D  I will check vitamin D levels    Orders:  -     Vitamin D 25 hydroxy; Future    6  Other iron deficiency anemia  Assessment & Plan:  Last blood work was last year and it showed hemoglobin and iron improving  I will repeat her CBC and iron panel  We will continue to monitor  Orders:  -     CBC and differential; Future  -     TSH, 3rd generation with Free T4 reflex; Future  -     Iron Panel (Includes Ferritin, Iron Sat%, Iron, and TIBC); Future    7  BMI 26 0-26 9,adult      BMI Counseling: Body mass index is 26 95 kg/m²  The BMI is above normal  Nutrition recommendations include decreasing portion sizes, encouraging healthy choices of fruits and vegetables and decreasing fast food intake   Rationale for BMI follow-up plan is due to patient being overweight or obese  Sundar Judd is a 23year old female with PMH of anemia, asthma, and depression, presenting for annual wellness exam  She reports that she is doing well over all, but is struggling with her depression  She feels that Wellbutrin isn't working and may want to try something different  She denies any fever, SOB, chest pain, or N/V/D/ Her asthma is well controlled with occasional PRN albuterol  She has no other complaints  Review of Systems   Constitutional: Negative for activity change, chills, fatigue and fever  Respiratory: Negative for shortness of breath  Cardiovascular: Negative for chest pain, palpitations and leg swelling  Gastrointestinal: Negative for abdominal pain  Musculoskeletal: Positive for back pain  Occasional     Neurological: Negative for dizziness and weakness  Psychiatric/Behavioral: Positive for sleep disturbance         Past Medical History:   Diagnosis Date   • Anemia    • Asthma      Past Surgical History:   Procedure Laterality Date   • WISDOM TOOTH EXTRACTION       Family History   Problem Relation Age of Onset   • No Known Problems Mother    • No Known Problems Father    • Breast cancer Maternal Grandmother    • Thyroid disease Maternal Grandmother    • Diabetes Paternal Grandmother      Social History     Socioeconomic History   • Marital status: Single     Spouse name: None   • Number of children: None   • Years of education: None   • Highest education level: None   Occupational History   • None   Tobacco Use   • Smoking status: Never   • Smokeless tobacco: Never   Vaping Use   • Vaping Use: Never used   Substance and Sexual Activity   • Alcohol use: No   • Drug use: No   • Sexual activity: Yes     Partners: Male   Other Topics Concern   • None   Social History Narrative   • None     Social Determinants of Health     Financial Resource Strain: Not on file   Food Insecurity: Not on file   Transportation Needs: Not on file   Physical Activity: Not on file   Stress: Not on file   Social Connections: Not on file   Intimate Partner Violence: Not on file   Housing Stability: Not on file     Current Outpatient Medications on File Prior to Visit   Medication Sig   • Albuterol Sulfate (ProAir RespiClick) 138 (90 Base) MCG/ACT AEPB Inhale 2 puffs 4 (four) times a day as needed (SOB)   • cetirizine (ZyrTEC) 10 mg tablet    • ipratropium-albuterol (DUO-NEB) 0 5-2 5 mg/3 mL nebulizer solution Every 6 hours   • ketoconazole (NIZORAL) 2 % shampoo APPLY TO SCALP AT LEAST THREE TIMES WEEKLY FOR 5 MINUTES THEN RINSE OFF  • Ventolin  (90 Base) MCG/ACT inhaler INHALE 2 PUFFS EVERY 4 TO 6 HOURS AS NEEDED FOR WHEEZING   • [DISCONTINUED] buPROPion (WELLBUTRIN XL) 150 mg 24 hr tablet Take 1 tablet (150 mg total) by mouth every morning   • ketoconazole (NIZORAL) 2 % cream Apply topically daily for 28 days (Patient not taking: Reported on 3/6/2023)   • [DISCONTINUED] etonogestrel-ethinyl estradiol (NuvaRing) 0 12-0 015 MG/24HR vaginal ring Insert vaginally and leave in place for 3 consecutive weeks, then remove for 1 week   (Patient not taking: Reported on 3/6/2023)     Allergies   Allergen Reactions   • Dog Epithelium Itching   • Grass Pollen(K-O-R-T-Swt Garret) Itching     Immunization History   Administered Date(s) Administered   • COVID-19 MODERNA VACC 0 5 ML IM 05/03/2021, 06/02/2021   • DTaP 01/10/2003, 03/07/2003, 05/05/2003, 02/09/2004, 11/15/2006   • HPV9 10/24/2019, 01/03/2020, 02/05/2021   • Hep A, ped/adol, 2 dose 10/24/2019, 04/27/2020   • Hep B, Adolescent or Pediatric 01/10/2003, 03/07/2003, 02/09/2004   • Hepatitis A 10/24/2019, 04/27/2020   • HiB 01/10/2003, 03/07/2003, 12/09/2003   • Hib (PRP-T) 01/10/2003   • INFLUENZA 12/03/2007, 01/08/2008, 01/24/2011, 03/14/2012, 11/19/2012   • IPV 01/10/2003, 03/07/2003, 12/09/2003, 11/15/2006   • Influenza Split High Dose Preservative Free IM 12/09/2003   • MMR 12/09/2003, 11/15/2006, 11/12/2015   • Meningococcal Conjugate (MCV4O) 12/29/2014, 11/21/2018   • Meningococcal MCV4P 11/21/2018   • Pneumococcal Conjugate 13-Valent 03/07/2003, 05/05/2003   • Pneumococcal Conjugate PCV 7 01/10/2003, 03/07/2003, 05/05/2003   • Pneumococcal Polysaccharide PPV23 01/10/2003   • Tdap 12/29/2014   • Tuberculin Skin Test-PPD Intradermal 11/21/2018   • Varicella 12/09/2003, 12/03/2007, 11/12/2015       Objective     /76 (BP Location: Left arm, Patient Position: Sitting, Cuff Size: Adult)   Pulse 77   Temp 98 2 °F (36 8 °C) (Tympanic)   Resp 16   Ht 5' 4" (1 626 m)   Wt 71 2 kg (157 lb)   SpO2 99%   BMI 26 95 kg/m²     Physical Exam  Vitals and nursing note reviewed  Constitutional:       Appearance: Normal appearance  She is normal weight  HENT:      Head: Normocephalic and atraumatic  Right Ear: Tympanic membrane normal       Left Ear: Tympanic membrane normal       Nose: Nose normal       Mouth/Throat:      Mouth: Mucous membranes are moist    Eyes:      Pupils: Pupils are equal, round, and reactive to light  Cardiovascular:      Rate and Rhythm: Normal rate and regular rhythm  Pulses: Normal pulses  Heart sounds: Normal heart sounds  Pulmonary:      Effort: Pulmonary effort is normal       Breath sounds: Normal breath sounds  Abdominal:      General: Abdomen is flat  Skin:     General: Skin is warm and dry  Capillary Refill: Capillary refill takes less than 2 seconds  Neurological:      General: No focal deficit present  Mental Status: She is alert  Psychiatric:         Mood and Affect: Mood normal        Alivia Mcdonald MD BMI Counseling: Body mass index is 26 95 kg/m²  The BMI is above normal  Nutrition recommendations include reducing portion sizes, decreasing overall calorie intake and 3-5 servings of fruits/vegetables daily  Exercise recommendations include moderate aerobic physical activity for 150 minutes/week

## 2023-03-06 NOTE — ASSESSMENT & PLAN NOTE
Last blood work was last year and it showed hemoglobin and iron improving  I will repeat her CBC and iron panel  We will continue to monitor

## 2023-03-06 NOTE — ASSESSMENT & PLAN NOTE
Not well controlled I am going to increase Wellbutrin to 300 mg  Discussed about possible side effect  She was told to come back in 4 to 6 weeks and I will consider adding another medication or switching her meds

## 2023-03-08 ENCOUNTER — APPOINTMENT (OUTPATIENT)
Dept: LAB | Age: 23
End: 2023-03-08

## 2023-03-08 DIAGNOSIS — D50.8 OTHER IRON DEFICIENCY ANEMIA: ICD-10-CM

## 2023-03-08 DIAGNOSIS — L65.9 HAIR LOSS: ICD-10-CM

## 2023-03-08 DIAGNOSIS — E55.9 VITAMIN D INSUFFICIENCY: ICD-10-CM

## 2023-03-08 LAB
25(OH)D3 SERPL-MCNC: 16 NG/ML (ref 30–100)
ALBUMIN SERPL BCP-MCNC: 4 G/DL (ref 3.5–5)
ALP SERPL-CCNC: 58 U/L (ref 46–116)
ALT SERPL W P-5'-P-CCNC: 22 U/L (ref 12–78)
ANION GAP SERPL CALCULATED.3IONS-SCNC: 5 MMOL/L (ref 4–13)
AST SERPL W P-5'-P-CCNC: 17 U/L (ref 5–45)
BASOPHILS # BLD AUTO: 0.07 THOUSANDS/ÂΜL (ref 0–0.1)
BASOPHILS NFR BLD AUTO: 1 % (ref 0–1)
BILIRUB SERPL-MCNC: 1.01 MG/DL (ref 0.2–1)
BUN SERPL-MCNC: 10 MG/DL (ref 5–25)
CALCIUM SERPL-MCNC: 9.1 MG/DL (ref 8.3–10.1)
CHLORIDE SERPL-SCNC: 107 MMOL/L (ref 96–108)
CO2 SERPL-SCNC: 24 MMOL/L (ref 21–32)
CREAT SERPL-MCNC: 0.77 MG/DL (ref 0.6–1.3)
EOSINOPHIL # BLD AUTO: 0.42 THOUSAND/ÂΜL (ref 0–0.61)
EOSINOPHIL NFR BLD AUTO: 5 % (ref 0–6)
ERYTHROCYTE [DISTWIDTH] IN BLOOD BY AUTOMATED COUNT: 15 % (ref 11.6–15.1)
FERRITIN SERPL-MCNC: 132 NG/ML (ref 8–388)
GFR SERPL CREATININE-BSD FRML MDRD: 111 ML/MIN/1.73SQ M
GLUCOSE P FAST SERPL-MCNC: 89 MG/DL (ref 65–99)
HCT VFR BLD AUTO: 37.8 % (ref 34.8–46.1)
HGB BLD-MCNC: 12.1 G/DL (ref 11.5–15.4)
IMM GRANULOCYTES # BLD AUTO: 0.03 THOUSAND/UL (ref 0–0.2)
IMM GRANULOCYTES NFR BLD AUTO: 0 % (ref 0–2)
IRON SATN MFR SERPL: 65 % (ref 15–50)
IRON SERPL-MCNC: 185 UG/DL (ref 50–170)
LYMPHOCYTES # BLD AUTO: 1.73 THOUSANDS/ÂΜL (ref 0.6–4.47)
LYMPHOCYTES NFR BLD AUTO: 19 % (ref 14–44)
MCH RBC QN AUTO: 23.7 PG (ref 26.8–34.3)
MCHC RBC AUTO-ENTMCNC: 32 G/DL (ref 31.4–37.4)
MCV RBC AUTO: 74 FL (ref 82–98)
MONOCYTES # BLD AUTO: 0.6 THOUSAND/ÂΜL (ref 0.17–1.22)
MONOCYTES NFR BLD AUTO: 7 % (ref 4–12)
NEUTROPHILS # BLD AUTO: 6.29 THOUSANDS/ÂΜL (ref 1.85–7.62)
NEUTS SEG NFR BLD AUTO: 68 % (ref 43–75)
NRBC BLD AUTO-RTO: 0 /100 WBCS
PLATELET # BLD AUTO: 372 THOUSANDS/UL (ref 149–390)
PMV BLD AUTO: 10.1 FL (ref 8.9–12.7)
POTASSIUM SERPL-SCNC: 3.8 MMOL/L (ref 3.5–5.3)
PROT SERPL-MCNC: 7.2 G/DL (ref 6.4–8.4)
RBC # BLD AUTO: 5.11 MILLION/UL (ref 3.81–5.12)
SODIUM SERPL-SCNC: 136 MMOL/L (ref 135–147)
TIBC SERPL-MCNC: 284 UG/DL (ref 250–450)
TSH SERPL DL<=0.05 MIU/L-ACNC: 1.05 UIU/ML (ref 0.45–4.5)
WBC # BLD AUTO: 9.14 THOUSAND/UL (ref 4.31–10.16)

## 2023-03-20 ENCOUNTER — TELEPHONE (OUTPATIENT)
Dept: FAMILY MEDICINE CLINIC | Facility: CLINIC | Age: 23
End: 2023-03-20

## 2023-03-20 DIAGNOSIS — E55.9 VITAMIN D DEFICIENCY: Primary | ICD-10-CM

## 2023-03-20 NOTE — TELEPHONE ENCOUNTER
----- Message from Henrene Libman, MD sent at 3/19/2023 10:06 AM EDT -----  Blood work showed low vitamin D   I recommend vitamin D 50,000 units weekly  All the rest of blood work came back stable

## 2023-03-21 RX ORDER — ERGOCALCIFEROL 1.25 MG/1
50000 CAPSULE ORAL WEEKLY
Qty: 12 CAPSULE | Refills: 1 | Status: SHIPPED | OUTPATIENT
Start: 2023-03-21

## 2023-03-21 NOTE — TELEPHONE ENCOUNTER
I sent rx to pharmacy for vitamin d and sent her a message through her my chart regarding results and the new rx that was sent   Pt is to call with any questions

## 2023-03-23 ENCOUNTER — TELEPHONE (OUTPATIENT)
Dept: FAMILY MEDICINE CLINIC | Facility: CLINIC | Age: 23
End: 2023-03-23

## 2023-03-23 NOTE — TELEPHONE ENCOUNTER
Spoke to mother and advised that she can check My Chart with results and recommendations  Advised her about prescription at pharmacy for vitamin D      Patient also wanted to know if there is anything Dr Emani Ibrahim can recommend for patient's hair loss  Went to dermatology and none of his treatments have worked

## 2023-03-24 NOTE — TELEPHONE ENCOUNTER
Pt aware she can use Biotin for hair loss  Pt states she tried the Biotin in the past and it did not work  Any additional recommendations?

## 2023-03-24 NOTE — TELEPHONE ENCOUNTER
Unfortunately I dont have any other recommendations and all of her labs are stable  She could try different dermatologist for other recommendations

## 2023-07-19 ENCOUNTER — TELEPHONE (OUTPATIENT)
Dept: FAMILY MEDICINE CLINIC | Facility: CLINIC | Age: 23
End: 2023-07-19

## 2023-07-19 DIAGNOSIS — F32.89 OTHER DEPRESSION: ICD-10-CM

## 2023-07-19 DIAGNOSIS — E55.9 VITAMIN D DEFICIENCY: ICD-10-CM

## 2023-07-19 RX ORDER — ERGOCALCIFEROL 1.25 MG/1
50000 CAPSULE ORAL WEEKLY
Qty: 12 CAPSULE | Refills: 0 | Status: SHIPPED | OUTPATIENT
Start: 2023-07-19

## 2023-07-19 RX ORDER — BUPROPION HYDROCHLORIDE 300 MG/1
300 TABLET ORAL EVERY MORNING
Qty: 90 TABLET | Refills: 0 | Status: SHIPPED | OUTPATIENT
Start: 2023-07-19

## 2023-07-19 NOTE — TELEPHONE ENCOUNTER
Patient said she went to refill her Bupropion and vitamin D and was told that they are not covered by her insurance and will need an authorization

## 2023-07-19 NOTE — TELEPHONE ENCOUNTER
I called [facility]. Pt filled Bupropion on 6/19/23 with a copay card. He was not sure what medication needed a prior auth of any. He states Vit D is not covered by insurance because it is a vitamin. The pharmacist will check if any [place] 's are needed and call back. I spoke with pt and she states her insurance told her she needs to get her Wellbutrin through mail order and her Vit D is not covered. Wellbutrin and Vit D sent to [facility] mail order pharmacy per pt's request. Pt also due for appt. sched appt for this Friday.

## 2023-08-08 ENCOUNTER — TELEPHONE (OUTPATIENT)
Dept: FAMILY MEDICINE CLINIC | Facility: CLINIC | Age: 23
End: 2023-08-08

## 2023-08-08 DIAGNOSIS — F32.89 OTHER DEPRESSION: ICD-10-CM

## 2023-08-08 RX ORDER — BUPROPION HYDROCHLORIDE 300 MG/1
300 TABLET ORAL EVERY MORNING
Qty: 7 TABLET | Refills: 0 | Status: SHIPPED | OUTPATIENT
Start: 2023-08-08

## 2023-08-08 NOTE — TELEPHONE ENCOUNTER
Message from [facility] asking for a small supply of Bupropion to hold the patient over until her mail-away prescription arrives

## 2023-08-15 ENCOUNTER — OFFICE VISIT (OUTPATIENT)
Dept: URGENT CARE | Age: 23
End: 2023-08-15
Payer: COMMERCIAL

## 2023-08-15 ENCOUNTER — APPOINTMENT (OUTPATIENT)
Dept: RADIOLOGY | Age: 23
End: 2023-08-15
Payer: COMMERCIAL

## 2023-08-15 VITALS
HEART RATE: 112 BPM | SYSTOLIC BLOOD PRESSURE: 108 MMHG | OXYGEN SATURATION: 98 % | TEMPERATURE: 102 F | RESPIRATION RATE: 18 BRPM | DIASTOLIC BLOOD PRESSURE: 66 MMHG

## 2023-08-15 DIAGNOSIS — J06.9 VIRAL URI: ICD-10-CM

## 2023-08-15 DIAGNOSIS — R06.02 SHORTNESS OF BREATH: ICD-10-CM

## 2023-08-15 DIAGNOSIS — J45.901 ASTHMA WITH ACUTE EXACERBATION, UNSPECIFIED ASTHMA SEVERITY, UNSPECIFIED WHETHER PERSISTENT: ICD-10-CM

## 2023-08-15 DIAGNOSIS — R01.1 MURMUR, CARDIAC: ICD-10-CM

## 2023-08-15 DIAGNOSIS — R11.2 NAUSEA AND VOMITING, UNSPECIFIED VOMITING TYPE: ICD-10-CM

## 2023-08-15 DIAGNOSIS — R50.9 FEVER, UNSPECIFIED FEVER CAUSE: Primary | ICD-10-CM

## 2023-08-15 LAB
ATRIAL RATE: 96 BPM
P AXIS: 33 DEGREES
PR INTERVAL: 114 MS
QRS AXIS: 45 DEGREES
QRSD INTERVAL: 88 MS
QT INTERVAL: 362 MS
QTC INTERVAL: 457 MS
S PYO AG THROAT QL: NEGATIVE
SARS-COV-2 AG UPPER RESP QL IA: NEGATIVE
T WAVE AXIS: -1 DEGREES
VALID CONTROL: NORMAL
VENTRICULAR RATE: 96 BPM

## 2023-08-15 PROCEDURE — 87811 SARS-COV-2 COVID19 W/OPTIC: CPT | Performed by: EMERGENCY MEDICINE

## 2023-08-15 PROCEDURE — 93005 ELECTROCARDIOGRAM TRACING: CPT | Performed by: EMERGENCY MEDICINE

## 2023-08-15 PROCEDURE — 99213 OFFICE O/P EST LOW 20 MIN: CPT | Performed by: EMERGENCY MEDICINE

## 2023-08-15 PROCEDURE — 87070 CULTURE OTHR SPECIMN AEROBIC: CPT | Performed by: EMERGENCY MEDICINE

## 2023-08-15 PROCEDURE — 71046 X-RAY EXAM CHEST 2 VIEWS: CPT

## 2023-08-15 PROCEDURE — 87880 STREP A ASSAY W/OPTIC: CPT | Performed by: EMERGENCY MEDICINE

## 2023-08-15 PROCEDURE — 93010 ELECTROCARDIOGRAM REPORT: CPT | Performed by: INTERNAL MEDICINE

## 2023-08-15 RX ORDER — IBUPROFEN 400 MG/1
400 TABLET ORAL EVERY 6 HOURS PRN
Status: SHIPPED | OUTPATIENT
Start: 2023-08-15

## 2023-08-15 RX ORDER — ALBUTEROL SULFATE 90 UG/1
4-6 AEROSOL, METERED RESPIRATORY (INHALATION) 4 TIMES DAILY
Qty: 8.5 G | Refills: 3 | Status: SHIPPED | OUTPATIENT
Start: 2023-08-15

## 2023-08-15 RX ORDER — PREDNISONE 20 MG/1
60 TABLET ORAL DAILY
Qty: 15 TABLET | Refills: 0 | Status: SHIPPED | OUTPATIENT
Start: 2023-08-15 | End: 2023-08-20

## 2023-08-15 RX ORDER — ONDANSETRON 4 MG/1
8 TABLET, ORALLY DISINTEGRATING ORAL ONCE
Status: COMPLETED | OUTPATIENT
Start: 2023-08-15 | End: 2023-08-15

## 2023-08-15 RX ORDER — ACETAMINOPHEN 325 MG/1
650 TABLET ORAL ONCE
Status: COMPLETED | OUTPATIENT
Start: 2023-08-15 | End: 2023-08-15

## 2023-08-15 RX ORDER — ONDANSETRON 8 MG/1
8 TABLET, ORALLY DISINTEGRATING ORAL EVERY 8 HOURS PRN
Qty: 20 TABLET | Refills: 0 | Status: SHIPPED | OUTPATIENT
Start: 2023-08-15

## 2023-08-15 RX ADMIN — ONDANSETRON 8 MG: 4 TABLET, ORALLY DISINTEGRATING ORAL at 14:39

## 2023-08-15 RX ADMIN — ACETAMINOPHEN 650 MG: 325 TABLET ORAL at 14:39

## 2023-08-15 NOTE — LETTER
August 15, 2023     Patient: Genesis Lin   YOB: 2000   Date of Visit: 8/15/2023       To Whom it May Concern:    Genesis Lin was seen in my clinic on 8/15/2023. She may return to work on 8/21/23 . If you have any questions or concerns, please don't hesitate to call.          Sincerely,          Codie Castellano, DO        CC: No Recipients

## 2023-08-15 NOTE — PROGRESS NOTES
[facility]        NAME: Sajan Alejandre is a 23 y.o. female  : 2000    MRN: [id number]  DATE: August 15, 2023  TIME: 5:19 PM    Assessment and Plan   Fever, unspecified fever cause [R50.9]  1. Fever, unspecified fever cause  Poct Covid 19 Rapid Antigen Test    acetaminophen (TYLENOL) tablet 650 mg    ibuprofen (MOTRIN) tablet 400 mg    ondansetron (ZOFRAN-ODT) dispersible tablet 8 mg    POCT rapid strepA    XR chest pa & lateral    ECG 12 lead    Throat culture    predniSONE 20 mg tablet    albuterol (ProAir HFA) 90 mcg/act inhaler    Ambulatory Referral to Cardiology    ondansetron (ZOFRAN-ODT) 8 mg disintegrating tablet      2. Shortness of breath        3. Asthma with acute exacerbation, unspecified asthma severity, unspecified whether persistent        4. Murmur, cardiac        5. Nausea and vomiting, unspecified vomiting type        6. Viral URI          -Chest x-ray without pneumothorax, pleural effusion or focal infiltrate  -Twelve-lead EKG revealed sinus rhythm with a ventricular rate of 96 with otherwise normal axis, intervals, incomplete right bundle branch block and nonspecific, diffuse T wave flattening seen throughout  -Systolic murmur appreciated on exam today, patient's mother states that patient was told that she had a murmur as a child however was never worked up for it in the past    -Had extensive discussion with patient and her mother at bedside. Rapid COVID and strep test negative, will send culture at this time. Employed decision making had with patient and mother regarding treating patient for presumptive asthma exacerbation given decreased breath sounds noted globally as well as shortness of breath. Also advised that the symptoms may be secondary to occult pneumonia as well as pulmonary embolism. Patient has no risk factors for pulmonary embolism and although tachycardic she was febrile at this time which could account for the tachycardia.   She does endorse left-sided pleuritic chest pain and advised that this may be secondary to a viral URI and/or bronchitis, but also the differential diagnosis is pulmonary embolism and other potential cardiac etiologies. Patient given option to seek care in the ED or go home and rest and trial corticosteroids and increased use of bronchodilator to see if symptoms help. I appreciated no focal abdominal tenderness on my examination without peritoneal signs, doubt acute intra-abdominal process or appendicitis at this time. I also advised patient's mother and patient that this may be a potential cause of her symptoms. Patient and her mother are amenable to going home and trialing supportive care as well as use of steroids and use of bronchodilator. I advised him that if symptoms are not improving or if they worsen at any time to seek care immediately in the ER for further evaluation and work-up. We will also give referral to cardiology for further evaluation for murmur. At time of departure from clinic patient was hemodynamically stable and her heart rate had improved after antipyretics. -Advised patient to follow-up with PCP and cardiologist as directed also stressed the importance of seeking care in the ED if symptoms persist or worsen. I answered all questions at bedside of patient and her mother and they are amenable to plan and voiced understanding.    -Feel headache is secondary to viral syndrome at this time as no focal neurological deficits were appreciated on my examination and patient was alert and oriented to person place time and event and nontoxic-appearing without meningeal signs. Patient Instructions       Follow up with PCP in 3-5 days. Proceed to  ER if symptoms worsen. Chief Complaint     Chief Complaint   Patient presents with   • Fever     Patient relates fever starting two night ago. Also has body aches, weakness, headache. Abdominal pain.  Vomiting last PM         History of Present Illness       23year-old female with history of asthma, beta thalassemia minor, allergic rhinitis presents with a chief complaint of 2 days of fever, Tmax 103 °F at home, generalized body aches, nonbloody nonbilious emesis, chest tightness intermittent shortness of breath as well as a bifrontal headache with onset of 2 days ago. Patient states that she recently returned from a trip to the beach 3 days ago; she states that that night when driving home she did not feel well and woke up the next day with fever and the aforementioned symptoms. She does endorse some chills but denies overt rigors, blurry vision, visual field deficits, focal sensorimotor deficits in her extremities or diarrhea. She denies burning on urination or other dysuric symptoms. Has intermittently tried ibuprofen for her symptoms however she states that after defervescence "the fever comes right back". She also endorses intermittent chest tightness but denies overt pleuritic chest pain. Denies palpitations, sustained chest pressure, denies radiation of chest tightness and denies associated diaphoresis lightheadedness or syncope. Denies current history of coagulation disorders thrombosis or family history of coagulation disorders or thrombosis. States that she drove back from the [place] which is an approximate 2-hour trip from her home. She denies sputum production, and is endorsing nasal congestion, as well as frontal and maxillary sinus pain and pressure. She does endorse some generalized abdominal discomfort when vomiting however denies sustained abdominal pain. Generalized Body Aches  The current episode started in the past 7 days. The problem occurs constantly. The problem has been waxing and waning since onset. The pain is moderate. Associated symptoms include congestion, headaches, rhinorrhea, a sore throat, swollen glands, a URI, fatigue, a fever, chest pain, shortness of breath, nausea, vomiting and muscle aches.  Pertinent negatives include no decreased vision, double vision, ear discharge, ear pain, eye discharge, eye itching, eye pain, eye redness, hearing loss, mouth sores, photophobia, stridor, coughing, wheezing, abdominal pain, constipation, diarrhea, joint pain, joint swelling, neck pain, neck stiffness or rash. Past treatments include one or more OTC medications. The treatment provided moderate relief. There is nasal and chest congestion. The congestion interferes with sleep. The congestion does not interfere with eating or drinking. Review of Systems   Review of Systems   Constitutional: Positive for activity change, appetite change, chills, fatigue and fever. Negative for diaphoresis and unexpected weight change. HENT: Positive for congestion, postnasal drip, rhinorrhea, sinus pressure, sinus pain and sore throat. Negative for dental problem, drooling, ear discharge, ear pain, facial swelling, hearing loss, mouth sores, nosebleeds, sneezing, tinnitus, trouble swallowing and voice change. Eyes: Negative for double vision, photophobia, pain, discharge, redness, itching and visual disturbance. Respiratory: Positive for shortness of breath. Negative for apnea, cough, choking, chest tightness, wheezing and stridor. Cardiovascular: Positive for chest pain. Negative for palpitations and leg swelling. Gastrointestinal: Positive for nausea and vomiting. Negative for abdominal distention, abdominal pain, anal bleeding, blood in stool, constipation, diarrhea and rectal pain. Genitourinary: Negative for decreased urine volume, difficulty urinating, dyspareunia, dysuria, enuresis, flank pain, frequency, genital sores, hematuria, menstrual problem, urgency, vaginal bleeding, vaginal discharge and vaginal pain. Musculoskeletal: Positive for arthralgias and myalgias. Negative for back pain, gait problem, joint pain, joint swelling, neck pain and neck stiffness. Skin: Negative for color change, pallor, rash and wound. Neurological: Positive for headaches. Negative for dizziness, tremors, seizures, syncope, facial asymmetry, speech difficulty, weakness, light-headedness and numbness. Hematological: Positive for adenopathy. Does not bruise/bleed easily.          Current Medications       Current Outpatient Medications:   •  albuterol (ProAir HFA) 90 mcg/act inhaler, Inhale 4-6 puffs 4 (four) times a day, Disp: 8.5 g, Rfl: 3  •  Albuterol Sulfate (ProAir RespiClick) 201 (90 Base) MCG/ACT AEPB, Inhale 2 puffs 4 (four) times a day as needed (SOB), Disp: 1 each, Rfl: 2  •  buPROPion (WELLBUTRIN XL) 300 mg 24 hr tablet, Take 1 tablet (300 mg total) by mouth every morning, Disp: 7 tablet, Rfl: 0  •  cetirizine (ZyrTEC) 10 mg tablet, , Disp: , Rfl:   •  ipratropium-albuterol (DUO-NEB) 0.5-2.5 mg/3 mL nebulizer solution, Every 6 hours, Disp: , Rfl:   •  ondansetron (ZOFRAN-ODT) 8 mg disintegrating tablet, Take 1 tablet (8 mg total) by mouth every 8 (eight) hours as needed for nausea or vomiting, Disp: 20 tablet, Rfl: 0  •  predniSONE 20 mg tablet, Take 3 tablets (60 mg total) by mouth daily for 5 days, Disp: 15 tablet, Rfl: 0  •  Ventolin  (90 Base) MCG/ACT inhaler, INHALE 2 PUFFS EVERY 4 TO 6 HOURS AS NEEDED FOR WHEEZING, Disp: 8 Inhaler, Rfl: 1  •  ergocalciferol (VITAMIN D2) 50,000 units, Take 1 capsule (50,000 Units total) by mouth once a week, Disp: 12 capsule, Rfl: 0  •  ketoconazole (NIZORAL) 2 % cream, Apply topically daily for 28 days, Disp: 60 g, Rfl: 0  •  ketoconazole (NIZORAL) 2 % shampoo, APPLY TO SCALP AT LEAST THREE TIMES WEEKLY FOR 5 MINUTES THEN RINSE OFF., Disp: , Rfl:   •  minoxidil (LONITEN) 2.5 mg tablet, 0.5 tablet at bedtime, Disp: 15 tablet, Rfl: 1    Current Facility-Administered Medications:   •  ibuprofen (MOTRIN) tablet 400 mg, 400 mg, Oral, Q6H PRN, Judie Getting, DO    Current Allergies     Allergies as of 08/15/2023 - Reviewed 08/15/2023   Allergen Reaction Noted   • Dog epithelium Itching 08/18/2022   • Grass pollen(k-o-r-t-swt vinod) Itching 08/18/2022            The following portions of the patient's history were reviewed and updated as appropriate: allergies, current medications, past family history, past medical history, past social history, past surgical history and problem list.     Past Medical History:   Diagnosis Date   • Anemia    • Asthma        Past Surgical History:   Procedure Laterality Date   • WISDOM TOOTH EXTRACTION         Family History   Problem Relation Age of Onset   • No Known Problems Mother    • No Known Problems Father    • Breast cancer Maternal Grandmother    • Thyroid disease Maternal Grandmother    • Diabetes Paternal Grandmother          Medications have been verified. Objective   /66   Pulse (!) 112   Temp (!) 102 °F (38.9 °C)   Resp 18   SpO2 98%   No LMP recorded. Physical Exam     Physical Exam  Vitals and nursing note reviewed. Constitutional:       General: She is not in acute distress. Appearance: Normal appearance. She is not ill-appearing, toxic-appearing or diaphoretic. HENT:      Head: Normocephalic and atraumatic. Jaw: No trismus or swelling. Right Ear: Tympanic membrane, ear canal and external ear normal. No decreased hearing noted. No laceration, drainage, swelling or tenderness. No middle ear effusion. There is no impacted cerumen. No foreign body. No mastoid tenderness. No PE tube. No hemotympanum. Tympanic membrane is not injected, scarred, erythematous, retracted or bulging. Left Ear: Tympanic membrane, ear canal and external ear normal. No decreased hearing noted. No laceration, drainage, swelling or tenderness. No middle ear effusion. There is no impacted cerumen. No foreign body. No mastoid tenderness. No PE tube. No hemotympanum. Tympanic membrane is not injected, scarred, erythematous, retracted or bulging. Nose: Congestion and rhinorrhea present.       Mouth/Throat:      Mouth: Mucous membranes are moist.      Pharynx: Posterior oropharyngeal erythema present. No oropharyngeal exudate. Eyes:      General: No visual field deficit. Right eye: No discharge. Extraocular Movements: Extraocular movements intact. Pupils: Pupils are equal, round, and reactive to light. Neck:      Trachea: Trachea and phonation normal. No abnormal tracheal secretions or tracheal deviation. Meningeal: Brudzinski's sign and Kernig's sign absent. Cardiovascular:      Rate and Rhythm: Normal rate and regular rhythm. Pulses: Normal pulses. Carotid pulses are 2+ on the right side and 2+ on the left side. Radial pulses are 2+ on the right side and 2+ on the left side. Heart sounds: S1 normal and S2 normal. Heart sounds not distant. Murmur heard. Systolic murmur is present. No friction rub. No gallop. No S3 or S4 sounds. Pulmonary:      Effort: Pulmonary effort is normal. No respiratory distress. Breath sounds: Normal breath sounds. No stridor. No wheezing, rhonchi or rales. Chest:      Chest wall: No tenderness. Abdominal:      General: Abdomen is flat. There is no distension. Palpations: There is no mass. Tenderness: There is no abdominal tenderness. There is no right CVA tenderness, left CVA tenderness, guarding or rebound. Negative signs include Vuong's sign, Rovsing's sign and McBurney's sign. Hernia: No hernia is present. Musculoskeletal:         General: No swelling, tenderness, deformity or signs of injury. Cervical back: Full passive range of motion without pain, normal range of motion and neck supple. No edema, erythema, signs of trauma, rigidity, torticollis or crepitus. No pain with movement, spinous process tenderness or muscular tenderness. Normal range of motion. Right lower leg: No edema. Left lower leg: No edema. Lymphadenopathy:      Cervical: Cervical adenopathy present.       Right cervical: Superficial cervical adenopathy and deep cervical adenopathy present. Left cervical: Superficial cervical adenopathy and deep cervical adenopathy present. Skin:     General: Skin is warm and dry. Coloration: Skin is not jaundiced or pale. Findings: No bruising, erythema, lesion or rash. Neurological:      General: No focal deficit present. Mental Status: She is alert and oriented to person, place, and time. GCS: GCS eye subscore is 4. GCS verbal subscore is 5. Cranial Nerves: No cranial nerve deficit, dysarthria or facial asymmetry. Sensory: Sensation is intact. No sensory deficit. Motor: Motor function is intact. No weakness, tremor, atrophy, abnormal muscle tone or seizure activity. Coordination: Coordination normal. Finger-Nose-Finger Test normal.      Gait: Gait is intact.  Gait normal.      Deep Tendon Reflexes: Reflexes normal.   Psychiatric:         Mood and Affect: Mood normal.

## 2023-08-17 LAB — BACTERIA THROAT CULT: NORMAL

## 2023-08-21 ENCOUNTER — HOSPITAL ENCOUNTER (EMERGENCY)
Facility: HOSPITAL | Age: 23
Discharge: HOME | End: 2023-08-21
Attending: EMERGENCY MEDICINE
Payer: COMMERCIAL

## 2023-08-21 ENCOUNTER — APPOINTMENT (EMERGENCY)
Dept: CT IMAGING | Facility: HOSPITAL | Age: 23
End: 2023-08-21
Payer: COMMERCIAL

## 2023-08-21 VITALS
OXYGEN SATURATION: 99 % | WEIGHT: 146.61 LBS | SYSTOLIC BLOOD PRESSURE: 124 MMHG | BODY MASS INDEX: 25.16 KG/M2 | RESPIRATION RATE: 16 BRPM | TEMPERATURE: 98.9 F | HEART RATE: 84 BPM | DIASTOLIC BLOOD PRESSURE: 68 MMHG

## 2023-08-21 DIAGNOSIS — N12 PYELONEPHRITIS: Primary | ICD-10-CM

## 2023-08-21 LAB
ALBUMIN SERPL BCP-MCNC: 3.7 G/DL (ref 3.5–5)
ALP SERPL-CCNC: 70 U/L (ref 34–104)
ALT SERPL W P-5'-P-CCNC: 36 U/L (ref 7–52)
ANION GAP SERPL CALCULATED.3IONS-SCNC: 7 MMOL/L
AST SERPL W P-5'-P-CCNC: 15 U/L (ref 13–39)
ATRIAL RATE: 93 BPM
BACTERIA UR QL AUTO: ABNORMAL /HPF
BASOPHILS # BLD AUTO: 0.06 THOUSANDS/ÂΜL (ref 0–0.1)
BASOPHILS NFR BLD AUTO: 0 % (ref 0–1)
BILIRUB SERPL-MCNC: 0.96 MG/DL (ref 0.2–1)
BILIRUB UR QL STRIP: NEGATIVE
BUN SERPL-MCNC: 12 MG/DL (ref 5–25)
CALCIUM SERPL-MCNC: 9 MG/DL (ref 8.4–10.2)
CARDIAC TROPONIN I PNL SERPL HS: <2 NG/L
CHLORIDE SERPL-SCNC: 99 MMOL/L (ref 96–108)
CLARITY UR: ABNORMAL
CO2 SERPL-SCNC: 29 MMOL/L (ref 21–32)
COLOR UR: ABNORMAL
CREAT SERPL-MCNC: 0.8 MG/DL (ref 0.6–1.3)
D DIMER PPP FEU-MCNC: 0.64 UG/ML FEU
EOSINOPHIL # BLD AUTO: 0.15 THOUSAND/ÂΜL (ref 0–0.61)
EOSINOPHIL NFR BLD AUTO: 1 % (ref 0–6)
ERYTHROCYTE [DISTWIDTH] IN BLOOD BY AUTOMATED COUNT: 14.6 % (ref 11.6–15.1)
EXT PREGNANCY TEST URINE: NEGATIVE
EXT. CONTROL: NORMAL
GFR SERPL CREATININE-BSD FRML MDRD: 106 ML/MIN/1.73SQ M
GLUCOSE SERPL-MCNC: 87 MG/DL (ref 65–140)
GLUCOSE UR STRIP-MCNC: NEGATIVE MG/DL
HCT VFR BLD AUTO: 35.4 % (ref 34.8–46.1)
HGB BLD-MCNC: 11.5 G/DL (ref 11.5–15.4)
HGB UR QL STRIP.AUTO: 10
IMM GRANULOCYTES # BLD AUTO: 0.24 THOUSAND/UL (ref 0–0.2)
IMM GRANULOCYTES NFR BLD AUTO: 1 % (ref 0–2)
KETONES UR STRIP-MCNC: NEGATIVE MG/DL
LEUKOCYTE ESTERASE UR QL STRIP: 500
LYMPHOCYTES # BLD AUTO: 2.5 THOUSANDS/ÂΜL (ref 0.6–4.47)
LYMPHOCYTES NFR BLD AUTO: 12 % (ref 14–44)
MCH RBC QN AUTO: 23.6 PG (ref 26.8–34.3)
MCHC RBC AUTO-ENTMCNC: 32.5 G/DL (ref 31.4–37.4)
MCV RBC AUTO: 73 FL (ref 82–98)
MONOCYTES # BLD AUTO: 2.81 THOUSAND/ÂΜL (ref 0.17–1.22)
MONOCYTES NFR BLD AUTO: 14 % (ref 4–12)
MUCOUS THREADS UR QL AUTO: ABNORMAL
NEUTROPHILS # BLD AUTO: 14.83 THOUSANDS/ÂΜL (ref 1.85–7.62)
NEUTS SEG NFR BLD AUTO: 72 % (ref 43–75)
NITRITE UR QL STRIP: NEGATIVE
NON-SQ EPI CELLS URNS QL MICRO: ABNORMAL /HPF
NRBC BLD AUTO-RTO: 0 /100 WBCS
P AXIS: 27 DEGREES
PH UR STRIP.AUTO: 7 [PH]
PLATELET # BLD AUTO: 462 THOUSANDS/UL (ref 149–390)
PMV BLD AUTO: 8.8 FL (ref 8.9–12.7)
POTASSIUM SERPL-SCNC: 3.6 MMOL/L (ref 3.5–5.3)
PR INTERVAL: 122 MS
PROT SERPL-MCNC: 7 G/DL (ref 6.4–8.4)
PROT UR STRIP-MCNC: ABNORMAL MG/DL
QRS AXIS: 41 DEGREES
QRSD INTERVAL: 90 MS
QT INTERVAL: 362 MS
QTC INTERVAL: 450 MS
RBC # BLD AUTO: 4.88 MILLION/UL (ref 3.81–5.12)
RBC #/AREA URNS AUTO: ABNORMAL /HPF
SODIUM SERPL-SCNC: 135 MMOL/L (ref 135–147)
SP GR UR STRIP.AUTO: 1.01 (ref 1–1.04)
T WAVE AXIS: 6 DEGREES
UROBILINOGEN UA: NEGATIVE MG/DL
VENTRICULAR RATE: 93 BPM
WBC # BLD AUTO: 20.59 THOUSAND/UL (ref 4.31–10.16)
WBC #/AREA URNS AUTO: ABNORMAL /HPF
WBC CLUMPS # UR AUTO: ABNORMAL /UL

## 2023-08-21 PROCEDURE — 96375 TX/PRO/DX INJ NEW DRUG ADDON: CPT

## 2023-08-21 PROCEDURE — 96361 HYDRATE IV INFUSION ADD-ON: CPT

## 2023-08-21 PROCEDURE — 99284 EMERGENCY DEPT VISIT MOD MDM: CPT

## 2023-08-21 PROCEDURE — 80053 COMPREHEN METABOLIC PANEL: CPT | Performed by: EMERGENCY MEDICINE

## 2023-08-21 PROCEDURE — 85025 COMPLETE CBC W/AUTO DIFF WBC: CPT | Performed by: EMERGENCY MEDICINE

## 2023-08-21 PROCEDURE — 85379 FIBRIN DEGRADATION QUANT: CPT | Performed by: EMERGENCY MEDICINE

## 2023-08-21 PROCEDURE — 99285 EMERGENCY DEPT VISIT HI MDM: CPT | Performed by: EMERGENCY MEDICINE

## 2023-08-21 PROCEDURE — 74177 CT ABD & PELVIS W/CONTRAST: CPT

## 2023-08-21 PROCEDURE — 93005 ELECTROCARDIOGRAM TRACING: CPT

## 2023-08-21 PROCEDURE — 81025 URINE PREGNANCY TEST: CPT | Performed by: EMERGENCY MEDICINE

## 2023-08-21 PROCEDURE — 96365 THER/PROPH/DIAG IV INF INIT: CPT

## 2023-08-21 PROCEDURE — 81001 URINALYSIS AUTO W/SCOPE: CPT | Performed by: EMERGENCY MEDICINE

## 2023-08-21 PROCEDURE — G1004 CDSM NDSC: HCPCS

## 2023-08-21 PROCEDURE — 93010 ELECTROCARDIOGRAM REPORT: CPT | Performed by: INTERNAL MEDICINE

## 2023-08-21 PROCEDURE — 71275 CT ANGIOGRAPHY CHEST: CPT

## 2023-08-21 PROCEDURE — 84484 ASSAY OF TROPONIN QUANT: CPT | Performed by: EMERGENCY MEDICINE

## 2023-08-21 PROCEDURE — 36415 COLL VENOUS BLD VENIPUNCTURE: CPT | Performed by: EMERGENCY MEDICINE

## 2023-08-21 RX ORDER — IBUPROFEN 400 MG/1
400 TABLET ORAL EVERY 6 HOURS PRN
Qty: 30 TABLET | Refills: 0 | Status: SHIPPED | OUTPATIENT
Start: 2023-08-21 | End: 2023-08-28

## 2023-08-21 RX ORDER — SULFAMETHOXAZOLE AND TRIMETHOPRIM 800; 160 MG/1; MG/1
1 TABLET ORAL 2 TIMES DAILY
Qty: 14 TABLET | Refills: 0 | Status: SHIPPED | OUTPATIENT
Start: 2023-08-21 | End: 2023-08-28

## 2023-08-21 RX ORDER — CEFTRIAXONE 1 G/50ML
1000 INJECTION, SOLUTION INTRAVENOUS ONCE
Status: COMPLETED | OUTPATIENT
Start: 2023-08-21 | End: 2023-08-21

## 2023-08-21 RX ORDER — KETOROLAC TROMETHAMINE 30 MG/ML
15 INJECTION, SOLUTION INTRAMUSCULAR; INTRAVENOUS ONCE
Status: COMPLETED | OUTPATIENT
Start: 2023-08-21 | End: 2023-08-21

## 2023-08-21 RX ADMIN — SODIUM CHLORIDE 1000 ML: 0.9 INJECTION, SOLUTION INTRAVENOUS at 14:36

## 2023-08-21 RX ADMIN — KETOROLAC TROMETHAMINE 15 MG: 30 INJECTION, SOLUTION INTRAMUSCULAR; INTRAVENOUS at 15:42

## 2023-08-21 RX ADMIN — CEFTRIAXONE 1000 MG: 1 INJECTION, SOLUTION INTRAVENOUS at 16:20

## 2023-08-21 RX ADMIN — IOHEXOL 100 ML: 350 INJECTION, SOLUTION INTRAVENOUS at 15:56

## 2023-08-21 NOTE — Clinical Note
Michael Agee was seen and treated in our emergency department on 8/21/2023. Diagnosis:     Leo Trevino  may return to work on return date. She may return on this date: 08/23/2023         If you have any questions or concerns, please don't hesitate to call.       Nehemias Man MD    ______________________________           _______________          _______________  Hospital Representative                              Date                                Time Returned call to Dr. Martin's office per request. Patient not taking Plavix, is currently on Eliquis. Ok to hold Eliquis 3 days prior to procedure. Will forward to Dr. Martin's office.

## 2023-08-21 NOTE — ED PROVIDER NOTES
History  Chief Complaint   Patient presents with   • Pain With Breathing     Pt reports she has had left side pain "for a while, and I saw my doctor for it, and he said if it gets worse to go to the ER. Also I get chest pain sometimes, when I breathe sometimes it hurts and I can't take a full breath."     28-year-old female presenting to the emergency department with left lower chest/left lower abdominal pain especially worse with deep inspiration or movement. Notes that she had a diarrheal illness last week with vomiting that is now resolved but having difficulty with bowel movement now and pain with straining as well as deep inspiration as well as movement. No fever chills cough congestion rhinorrhea. No urinary frequency. No shortness of breath. Prior to Admission Medications   Prescriptions Last Dose Informant Patient Reported? Taking? Albuterol Sulfate (ProAir RespiClick) 510 (90 Base) MCG/ACT AEPB More than a month Self No No   Sig: Inhale 2 puffs 4 (four) times a day as needed (SOB)   Ventolin  (90 Base) MCG/ACT inhaler More than a month Self No No   Sig: INHALE 2 PUFFS EVERY 4 TO 6 HOURS AS NEEDED FOR WHEEZING   albuterol (ProAir HFA) 90 mcg/act inhaler More than a month  No No   Sig: Inhale 4-6 puffs 4 (four) times a day   buPROPion (WELLBUTRIN XL) 300 mg 24 hr tablet 8/21/2023  No Yes   Sig: Take 1 tablet (300 mg total) by mouth every morning   cetirizine (ZyrTEC) 10 mg tablet 8/20/2023 Self Yes Yes   ergocalciferol (VITAMIN D2) 50,000 units Past Week  No Yes   Sig: Take 1 capsule (50,000 Units total) by mouth once a week   ipratropium-albuterol (DUO-NEB) 0.5-2.5 mg/3 mL nebulizer solution More than a month Self Yes No   Sig: Every 6 hours   ketoconazole (NIZORAL) 2 % cream   No No   Sig: Apply topically daily for 28 days   ketoconazole (NIZORAL) 2 % shampoo More than a month Self Yes No   Sig: APPLY TO SCALP AT LEAST THREE TIMES WEEKLY FOR 5 MINUTES THEN RINSE OFF.    minoxidil (LONITEN) 2.5 mg tablet Not Taking  No No   Si.5 tablet at bedtime   Patient not taking: Reported on 2023   ondansetron (ZOFRAN-ODT) 8 mg disintegrating tablet More than a month  No No   Sig: Take 1 tablet (8 mg total) by mouth every 8 (eight) hours as needed for nausea or vomiting   predniSONE 20 mg tablet   No No   Sig: Take 3 tablets (60 mg total) by mouth daily for 5 days      Facility-Administered Medications Last Administration Doses Remaining   ibuprofen (MOTRIN) tablet 400 mg None recorded           Past Medical History:   Diagnosis Date   • Anemia    • Asthma        Past Surgical History:   Procedure Laterality Date   • WISDOM TOOTH EXTRACTION         Family History   Problem Relation Age of Onset   • No Known Problems Mother    • No Known Problems Father    • Breast cancer Maternal Grandmother    • Thyroid disease Maternal Grandmother    • Diabetes Paternal Grandmother      I have reviewed and agree with the history as documented. E-Cigarette/Vaping   • E-Cigarette Use Never User      E-Cigarette/Vaping Substances   • Nicotine No    • THC No    • CBD No    • Flavoring No    • Other No    • Unknown No      Social History     Tobacco Use   • Smoking status: Never   • Smokeless tobacco: Never   Vaping Use   • Vaping Use: Never used   Substance Use Topics   • Alcohol use: No   • Drug use: No       Review of Systems   Constitutional: Negative for chills and fever. HENT: Negative for ear pain and sore throat. Eyes: Negative for pain and visual disturbance. Respiratory: Negative for cough and shortness of breath. Cardiovascular: Positive for chest pain. Negative for palpitations. Gastrointestinal: Positive for abdominal pain. Negative for vomiting. Genitourinary: Negative for dysuria and hematuria. Musculoskeletal: Negative for arthralgias and back pain. Skin: Negative for color change and rash. Neurological: Negative for seizures and syncope.    All other systems reviewed and are negative. Physical Exam  Physical Exam  Vitals and nursing note reviewed. Constitutional:       General: She is not in acute distress. Appearance: She is well-developed. HENT:      Head: Normocephalic and atraumatic. Eyes:      Conjunctiva/sclera: Conjunctivae normal.   Cardiovascular:      Rate and Rhythm: Normal rate and regular rhythm. Heart sounds: No murmur heard. Pulmonary:      Effort: Pulmonary effort is normal. No respiratory distress. Breath sounds: Normal breath sounds. Abdominal:      Palpations: Abdomen is soft. Tenderness: There is abdominal tenderness. There is left CVA tenderness. There is no guarding or rebound. Comments: llq ttp   Musculoskeletal:         General: No swelling. Cervical back: Neck supple. Skin:     General: Skin is warm and dry. Capillary Refill: Capillary refill takes less than 2 seconds. Neurological:      Mental Status: She is alert.    Psychiatric:         Mood and Affect: Mood normal.         Vital Signs  ED Triage Vitals [08/21/23 1410]   Temperature Pulse Respirations Blood Pressure SpO2   98.9 °F (37.2 °C) 101 18 135/70 99 %      Temp Source Heart Rate Source Patient Position - Orthostatic VS BP Location FiO2 (%)   Oral Monitor Sitting Left arm --      Pain Score       7           Vitals:    08/21/23 1410 08/21/23 1623   BP: 135/70 124/68   Pulse: 101 84   Patient Position - Orthostatic VS: Sitting Sitting         Visual Acuity      ED Medications  Medications   sodium chloride 0.9 % bolus 1,000 mL (0 mL Intravenous Stopped 8/21/23 1620)   ketorolac (TORADOL) injection 15 mg (15 mg Intravenous Given 8/21/23 1542)   iohexol (OMNIPAQUE) 350 MG/ML injection (SINGLE-DOSE) 100 mL (100 mL Intravenous Given 8/21/23 1556)   cefTRIAXone (ROCEPHIN) IVPB (premix in dextrose) 1,000 mg 50 mL (0 mg Intravenous Stopped 8/21/23 1655)       Diagnostic Studies  Results Reviewed     Procedure Component Value Units Date/Time    Urine culture [[id number]]     Lab Status: No result Specimen: Urine     Urine Microscopic [[id number]]  (Abnormal) Collected: 08/21/23 1543    Lab Status: Final result Specimen: Urine, Clean Catch Updated: 08/21/23 1601     RBC, UA 0-1 /hpf      WBC, UA 10-20 /hpf      Epithelial Cells Occasional /hpf      Bacteria, UA Occasional /hpf      WBC Clumps Occasional     MUCUS THREADS Occasional    UA (URINE) with reflex to Scope [[id number]]  (Abnormal) Collected: 08/21/23 1543    Lab Status: Final result Specimen: Urine, Clean Catch Updated: 08/21/23 1551     Color, UA Straw     Clarity, UA Slightly Cloudy     Specific Gravity, UA 1.010     pH, UA 7.0     Leukocytes, .0     Nitrite, UA Negative     Protein, UA 15 (Trace) mg/dl      Glucose, UA Negative mg/dl      Ketones, UA Negative mg/dl      Bilirubin, UA Negative     Occult Blood, UA 10.0     UROBILINOGEN UA Negative mg/dL     HS Troponin 0hr (reflex protocol) [[id number]]  (Normal) Collected: 08/21/23 1436    Lab Status: Final result Specimen: Blood from Arm, Left Updated: 08/21/23 1509     hs TnI 0hr <2 ng/L     Comprehensive metabolic panel [[id number]] Collected: 08/21/23 1436    Lab Status: Final result Specimen: Blood from Arm, Left Updated: 08/21/23 1501     Sodium 135 mmol/L      Potassium 3.6 mmol/L      Chloride 99 mmol/L      CO2 29 mmol/L      ANION GAP 7 mmol/L      BUN 12 mg/dL      Creatinine 0.80 mg/dL      Glucose 87 mg/dL      Calcium 9.0 mg/dL      AST 15 U/L      ALT 36 U/L      Alkaline Phosphatase 70 U/L      Total Protein 7.0 g/dL      Albumin 3.7 g/dL      Total Bilirubin 0.96 mg/dL      eGFR 106 ml/min/1.73sq m     Narrative:      [place] guidelines for Chronic Kidney Disease (CKD):   •  Stage 1 with normal or high GFR (GFR > 90 mL/min/1.73 square meters)  •  Stage 2 Mild CKD (GFR = 60-89 mL/min/1.73 square meters)  •  Stage 3A Moderate CKD (GFR = 45-59 mL/min/1.73 square meters)  •  Stage 3B Moderate CKD (GFR = 30-44 mL/min/1.73 square meters)  •  Stage 4 Severe CKD (GFR = 15-29 mL/min/1.73 square meters)  •  Stage 5 End Stage CKD (GFR <15 mL/min/1.73 square meters)  Note: GFR calculation is accurate only with a steady state creatinine    D-dimer, quantitative [[id number]]  (Abnormal) Collected: 08/21/23 1436    Lab Status: Final result Specimen: Blood from Arm, Left Updated: 08/21/23 1501     D-Dimer, Quant 0.64 ug/ml FEU     CBC and differential [[id number]]  (Abnormal) Collected: 08/21/23 1436    Lab Status: Final result Specimen: Blood from Arm, Left Updated: 08/21/23 1446     WBC 20.59 Thousand/uL      RBC 4.88 Million/uL      Hemoglobin 11.5 g/dL      Hematocrit 35.4 %      MCV 73 fL      MCH 23.6 pg      MCHC 32.5 g/dL      RDW 14.6 %      MPV 8.8 fL      Platelets 813 Thousands/uL      nRBC 0 /100 WBCs      Neutrophils Relative 72 %      Immat GRANS % 1 %      Lymphocytes Relative 12 %      Monocytes Relative 14 %      Eosinophils Relative 1 %      Basophils Relative 0 %      Neutrophils Absolute 14.83 Thousands/µL      Immature Grans Absolute 0.24 Thousand/uL      Lymphocytes Absolute 2.50 Thousands/µL      Monocytes Absolute 2.81 Thousand/µL      Eosinophils Absolute 0.15 Thousand/µL      Basophils Absolute 0.06 Thousands/µL     POCT pregnancy, urine [[id number]]  (Normal) Resulted: 08/21/23 1437    Lab Status: Final result Updated: 08/21/23 1437     EXT Preg Test, Ur Negative     Control Valid                 PE Study with CT abdomen & pelvis with contrast   Final Result by Fredrick Soto MD (08/21 1648)      No pulmonary embolus to the proximal subsegmental level. Circumferential bladder wall thickening with pericystic stranding, mild left periureteral stranding, and left renal cortical wedge-shaped hypoattenuation consistent with ascending urinary tract infection and left pyelonephritis. No perinephric abscess.                Workstation performed: [id number] Procedures  Procedures         ED Course         CRAFFT    Flowsheet Row Most Recent Value   KRIS Initial Screen: During the past 12 months, did you:    1. Drink any alcohol (more than a few sips)? No Filed at: 08/21/2023 1412   2. Smoke any marijuana or hashish No Filed at: 08/21/2023 1412   3. Use anything else to get high? ("anything else" includes illegal drugs, over the counter and prescription drugs, and things that you sniff or 'guzman')? No Filed at: 08/21/2023 1412                                          Medical Decision Making  27-year-old female with left-sided pleuritic chest pain along with left lower abdominal pain. Differential diagnosis includes pyelonephritis, pulmonary embolism, diverticulitis. UA consistent with UTI. CT chest abdomen pelvis performed which ruled out PE but does show pyelonephritis. Will treat with ceftriaxone in the ED, Bactrim at home, PCP follow-up. Urine culture sent. Amount and/or Complexity of Data Reviewed  Labs: ordered. Radiology: ordered. Risk  Prescription drug management. Disposition  Final diagnoses:   Pyelonephritis     Time reflects when diagnosis was documented in both MDM as applicable and the Disposition within this note     Time User Action Codes Description Comment    8/21/2023  5:01 PM Darrell Castellano Add [N12] Pyelonephritis       ED Disposition     ED Disposition   Discharge    Condition   Stable    Date/Time   Mon Aug 21, 2023  5:01 PM    [address] discharge to home/self care.                Follow-up Information     Follow up With Specialties Details Why [address], MD Family Medicine   [address] [place] [address] Drive [address] [phone]            Patient's Medications   Discharge Prescriptions    IBUPROFEN (MOTRIN) 400 MG TABLET    Take 1 tablet (400 mg total) by mouth every 6 (six) hours as needed for mild pain (Body aches or fever) for up to 7 days       Start Date: 8/21/2023 End Date: 8/28/2023       Order Dose: 400 mg       Quantity: 30 tablet    Refills: 0    SULFAMETHOXAZOLE-TRIMETHOPRIM (BACTRIM DS) 800-160 MG PER TABLET    Take 1 tablet by mouth 2 (two) times a day for 7 days smx-tmp DS (BACTRIM) 800-160 mg tabs (1tab q12 D10)       Start Date: 8/21/2023 End Date: 8/28/2023       Order Dose: 1 tablet       Quantity: 14 tablet    Refills: 0       No discharge procedures on file.     PDMP Review     None          ED Provider  Electronically Signed by           Shubham Borden MD  08/21/23 2013

## 2023-08-28 ENCOUNTER — RA CDI HCC (OUTPATIENT)
Dept: OTHER | Facility: HOSPITAL | Age: 23
End: 2023-08-28

## 2023-08-28 NOTE — PROGRESS NOTES
[facility] coding opportunities       Chart reviewed, no opportunity found: CHART REVIEWED, NO OPPORTUNITY FOUND        Patients Insurance        Commercial Insurance: Christophe Bullock

## 2023-09-06 ENCOUNTER — OFFICE VISIT (OUTPATIENT)
Dept: FAMILY MEDICINE CLINIC | Facility: CLINIC | Age: 23
End: 2023-09-06
Payer: COMMERCIAL

## 2023-09-06 VITALS
OXYGEN SATURATION: 98 % | TEMPERATURE: 100.3 F | DIASTOLIC BLOOD PRESSURE: 60 MMHG | WEIGHT: 148.2 LBS | SYSTOLIC BLOOD PRESSURE: 110 MMHG | HEIGHT: 64 IN | RESPIRATION RATE: 16 BRPM | BODY MASS INDEX: 25.3 KG/M2 | HEART RATE: 112 BPM

## 2023-09-06 DIAGNOSIS — N12 PYELONEPHRITIS: Primary | ICD-10-CM

## 2023-09-06 DIAGNOSIS — F32.89 OTHER DEPRESSION: ICD-10-CM

## 2023-09-06 PROCEDURE — 99213 OFFICE O/P EST LOW 20 MIN: CPT | Performed by: FAMILY MEDICINE

## 2023-09-06 RX ORDER — BUPROPION HYDROCHLORIDE 300 MG/1
300 TABLET ORAL EVERY MORNING
Qty: 90 TABLET | Refills: 1 | Status: SHIPPED | OUTPATIENT
Start: 2023-09-06

## 2023-09-06 NOTE — PROGRESS NOTES
Name: Shawn Thomas      : 2000      MRN: [id number]  Encounter Provider: Katharina Brunner, MD  Encounter Date: 2023   [facility]     1. Pyelonephritis  Assessment & Plan:  Resolved. Continue increase oral hydration. We will continue to monitor. She was told if any urinary symptoms to call back. 2. Other depression  Assessment & Plan:  Well-controlled on Wellbutrin 300 mg daily. Continue same. We will continue to monitor. Orders:  -     buPROPion (WELLBUTRIN XL) 300 mg 24 hr tablet; Take 1 tablet (300 mg total) by mouth every morning    3. BMI 25.0-25.9,adult           Subjective     She is here today for follow-up for depression. She is on Wellbutrin. She is doing well on the medication. She denies any side effect from the medication. She had recently pyelonephritis and was treated with antibiotic. She stated her symptoms improved. She denies any complaint today. Review of Systems   Constitutional: Negative for chills and fever. HENT: Negative for trouble swallowing. Eyes: Negative for visual disturbance. Respiratory: Negative for cough and shortness of breath. Cardiovascular: Negative for chest pain, palpitations and leg swelling. Gastrointestinal: Negative for abdominal pain, constipation and diarrhea. Endocrine: Negative for cold intolerance and heat intolerance. Genitourinary: Negative for difficulty urinating and dysuria. Musculoskeletal: Negative for gait problem. Skin: Negative for rash. Neurological: Negative for dizziness, tremors, seizures and headaches. Hematological: Negative for adenopathy. Psychiatric/Behavioral: Negative for behavioral problems.        Past Medical History:   Diagnosis Date   • Anemia    • Asthma      Past Surgical History:   Procedure Laterality Date   • WISDOM TOOTH EXTRACTION       Family History   Problem Relation Age of Onset   • No Known Problems Mother • No Known Problems Father    • Breast cancer Maternal Grandmother    • Thyroid disease Maternal Grandmother    • Diabetes Paternal Grandmother      Social History     Socioeconomic History   • Marital status: Single     Spouse name: None   • Number of children: None   • Years of education: None   • Highest education level: None   Occupational History   • None   Tobacco Use   • Smoking status: Never   • Smokeless tobacco: Never   Vaping Use   • Vaping Use: Never used   Substance and Sexual Activity   • Alcohol use: No   • Drug use: No   • Sexual activity: Yes     Partners: Male   Other Topics Concern   • None   Social History Narrative   • None     Social Determinants of Health     Financial Resource Strain: Not on file   Food Insecurity: Not on file   Transportation Needs: Not on file   Physical Activity: Not on file   Stress: Not on file   Social Connections: Not on file   Intimate Partner Violence: Not on file   Housing Stability: Not on file     Current Outpatient Medications on File Prior to Visit   Medication Sig   • cetirizine (ZyrTEC) 10 mg tablet    • ergocalciferol (VITAMIN D2) 50,000 units Take 1 capsule (50,000 Units total) by mouth once a week   • ipratropium-albuterol (DUO-NEB) 0.5-2.5 mg/3 mL nebulizer solution Every 6 hours   • ketoconazole (NIZORAL) 2 % shampoo APPLY TO SCALP AT LEAST THREE TIMES WEEKLY FOR 5 MINUTES THEN RINSE OFF.    • ondansetron (ZOFRAN-ODT) 8 mg disintegrating tablet Take 1 tablet (8 mg total) by mouth every 8 (eight) hours as needed for nausea or vomiting   • Ventolin  (90 Base) MCG/ACT inhaler INHALE 2 PUFFS EVERY 4 TO 6 HOURS AS NEEDED FOR WHEEZING   • [DISCONTINUED] buPROPion (WELLBUTRIN XL) 300 mg 24 hr tablet Take 1 tablet (300 mg total) by mouth every morning   • albuterol (ProAir HFA) 90 mcg/act inhaler Inhale 4-6 puffs 4 (four) times a day (Patient not taking: Reported on 9/6/2023)   • Albuterol Sulfate (ProAir RespiClick) 464 (90 Base) MCG/ACT AEPB Inhale 2 puffs 4 (four) times a day as needed (SOB) (Patient not taking: Reported on 9/6/2023)   • ibuprofen (MOTRIN) 400 mg tablet Take 1 tablet (400 mg total) by mouth every 6 (six) hours as needed for mild pain (Body aches or fever) for up to 7 days   • ketoconazole (NIZORAL) 2 % cream Apply topically daily for 28 days   • minoxidil (LONITEN) 2.5 mg tablet 0.5 tablet at bedtime (Patient not taking: Reported on 8/21/2023)     Allergies   Allergen Reactions   • Dog Epithelium Itching   • Grass Pollen(K-O-R-T-Swt Garret) Itching     Immunization History   Administered Date(s) Administered   • COVID-19 MODERNA VACC 0.5 ML IM 05/03/2021, 06/02/2021   • DTaP 01/10/2003, 03/07/2003, 05/05/2003, 02/09/2004, 11/15/2006   • HPV9 10/24/2019, 01/03/2020, 02/05/2021   • Hep A, ped/adol, 2 dose 10/24/2019, 04/27/2020   • Hep B, Adolescent or Pediatric 01/10/2003, 03/07/2003, 02/09/2004   • Hepatitis A 10/24/2019, 04/27/2020   • HiB 01/10/2003, 03/07/2003, 12/09/2003   • Hib (PRP-T) 01/10/2003   • INFLUENZA 12/03/2007, 01/08/2008, 01/24/2011, 03/14/2012, 11/19/2012   • IPV 01/10/2003, 03/07/2003, 12/09/2003, 11/15/2006   • Influenza Split High Dose Preservative Free IM 12/09/2003   • MMR 12/09/2003, 11/15/2006, 11/12/2015   • Meningococcal Conjugate (MCV4O) 12/29/2014, 11/21/2018   • Meningococcal MCV4P 11/21/2018   • Pneumococcal Conjugate 13-Valent 03/07/2003, 05/05/2003   • Pneumococcal Conjugate PCV 7 01/10/2003, 03/07/2003, 05/05/2003   • Pneumococcal Polysaccharide PPV23 01/10/2003   • Tdap 12/29/2014   • Tuberculin Skin Test-PPD Intradermal 11/21/2018   • Varicella 12/09/2003, 12/03/2007, 11/12/2015       Objective     /60 (BP Location: Left arm, Patient Position: Sitting, Cuff Size: Standard)   Pulse (!) 112   Temp 100.3 °F (37.9 °C) (Tympanic)   Resp 16   Ht 5' 4" (1.626 m)   Wt 67.2 kg (148 lb 3.2 oz)   LMP 08/10/2023 (Exact Date)   SpO2 98%   BMI 25.44 kg/m²     Physical Exam  Vitals and nursing note reviewed. Constitutional:       Appearance: She is well-developed. HENT:      Head: Normocephalic and atraumatic. Eyes:      Pupils: Pupils are equal, round, and reactive to light. Cardiovascular:      Rate and Rhythm: Normal rate and regular rhythm. Heart sounds: Normal heart sounds. Pulmonary:      Effort: Pulmonary effort is normal.      Breath sounds: Normal breath sounds. Abdominal:      General: Bowel sounds are normal.      Palpations: Abdomen is soft. Musculoskeletal:         General: Normal range of motion. Cervical back: Normal range of motion and neck supple. Lymphadenopathy:      Cervical: No cervical adenopathy. Skin:     General: Skin is warm. Neurological:      Mental Status: She is alert and oriented to person, place, and time. Cranial Nerves: No cranial nerve deficit. Peg Goncalves MD BMI Counseling: Body mass index is 25.44 kg/m². The BMI is above normal. Nutrition recommendations include reducing portion sizes, decreasing overall calorie intake and 3-5 servings of fruits/vegetables daily. Exercise recommendations include moderate aerobic physical activity for 150 minutes/week.

## 2023-09-06 NOTE — ASSESSMENT & PLAN NOTE
Resolved. Continue increase oral hydration. We will continue to monitor. She was told if any urinary symptoms to call back.

## 2023-11-05 PROBLEM — N12 PYELONEPHRITIS: Status: RESOLVED | Noted: 2023-09-06 | Resolved: 2023-11-05

## 2024-02-20 ENCOUNTER — ANNUAL EXAM (OUTPATIENT)
Age: 24
End: 2024-02-20
Payer: COMMERCIAL

## 2024-02-20 VITALS
BODY MASS INDEX: 28.07 KG/M2 | WEIGHT: 164.4 LBS | SYSTOLIC BLOOD PRESSURE: 126 MMHG | DIASTOLIC BLOOD PRESSURE: 62 MMHG | HEIGHT: 64 IN

## 2024-02-20 DIAGNOSIS — Z11.3 SCREENING FOR STD (SEXUALLY TRANSMITTED DISEASE): ICD-10-CM

## 2024-02-20 DIAGNOSIS — Z30.011 ENCOUNTER FOR INITIAL PRESCRIPTION OF CONTRACEPTIVE PILLS: ICD-10-CM

## 2024-02-20 DIAGNOSIS — Z01.419 ENCOUNTER FOR ANNUAL ROUTINE GYNECOLOGICAL EXAMINATION: Primary | ICD-10-CM

## 2024-02-20 PROCEDURE — 87491 CHLMYD TRACH DNA AMP PROBE: CPT | Performed by: OBSTETRICS & GYNECOLOGY

## 2024-02-20 PROCEDURE — 87591 N.GONORRHOEAE DNA AMP PROB: CPT | Performed by: OBSTETRICS & GYNECOLOGY

## 2024-02-20 PROCEDURE — S0612 ANNUAL GYNECOLOGICAL EXAMINA: HCPCS | Performed by: OBSTETRICS & GYNECOLOGY

## 2024-02-20 PROCEDURE — G0145 SCR C/V CYTO,THINLAYER,RESCR: HCPCS | Performed by: OBSTETRICS & GYNECOLOGY

## 2024-02-20 RX ORDER — NORETHINDRONE ACETATE AND ETHINYL ESTRADIOL 1.5-30(21)
1 KIT ORAL DAILY
Qty: 84 TABLET | Refills: 3 | Status: SHIPPED | OUTPATIENT
Start: 2024-02-20

## 2024-02-20 NOTE — PROGRESS NOTES
"Assessment/Plan:    1. Encounter for annual routine gynecological examination    - Liquid-based pap, screening    2. Encounter for initial prescription of contraceptive pills    - norethindrone-ethinyl estradiol-iron (Loestrin Fe 1.5/30) 1.5-30 MG-MCG tablet; Take 1 tablet by mouth daily  Dispense: 84 tablet; Refill: 3    3. Screening for STD (sexually transmitted disease)    - Chlamydia/GC amplified DNA by PCR    Subjective      Kelley Gallo is a 23 y.o. female who presents for annual exam. Periods are regular.  She was not comfortable using nuvaring - would like to try an OCP.    Current contraception: condoms  History of abnormal Pap smear: no  Regular self breast exam: yes  History of abnormal mammogram: no  History of abnormal lipids: no    Menstrual History:  Nulligravida  Patient's last menstrual period was 01/28/2024 (exact date).  Period Cycle (Days): 28  Period Duration (Days): 6-7  Period Pattern: Regular  Menstrual Flow: Heavy, Moderate (Heavy x1-2 days)  Menstrual Control: Maxi pad, Tampon  Menstrual Control Change Freq (Hours): 4-6  Dysmenorrhea: (!) Moderate  Dysmenorrhea Symptoms: Cramping, Diarrhea, Headache (bloating)    Past Medical History:   Diagnosis Date    Anemia     Asthma        Family History   Problem Relation Age of Onset    No Known Problems Mother     No Known Problems Father     Breast cancer Maternal Grandmother     Thyroid disease Maternal Grandmother     Diabetes Paternal Grandmother        The following portions of the patient's history were reviewed and updated as appropriate: allergies, current medications, past family history, past medical history, past social history, past surgical history, and problem list.    Review of Systems  Pertinent items are noted in HPI.      Objective      /62 (BP Location: Right arm, Patient Position: Sitting, Cuff Size: Large)   Ht 5' 4\" (1.626 m)   Wt 74.6 kg (164 lb 6.4 oz)   LMP 01/28/2024 (Exact Date)   BMI 28.22 kg/m²     General:   " alert and oriented, in no acute distress   Heart:  Breasts: regular rate and rhythm  appear normal, no suspicious masses, no skin or nipple changes or axillary nodes.   Lungs: Effort normal   Abdomen: soft, non-tender, without masses or organomegaly   Vulva: normal   Vagina: normal mucosa   Cervix: no lesions   Uterus: normal size, mobile, non-tender   Adnexa: normal adnexa and no mass, fullness, tenderness

## 2024-02-21 PROBLEM — H10.89 OTHER CONJUNCTIVITIS: Status: RESOLVED | Noted: 2019-10-24 | Resolved: 2024-02-21

## 2024-02-21 PROBLEM — H10.31 ACUTE BACTERIAL CONJUNCTIVITIS OF RIGHT EYE: Status: RESOLVED | Noted: 2019-01-09 | Resolved: 2024-02-21

## 2024-02-22 LAB
C TRACH DNA SPEC QL NAA+PROBE: NEGATIVE
N GONORRHOEA DNA SPEC QL NAA+PROBE: NEGATIVE

## 2024-02-23 LAB
LAB AP GYN PRIMARY INTERPRETATION: NORMAL
LAB AP LMP: NORMAL
Lab: NORMAL

## 2024-04-18 ENCOUNTER — TELEPHONE (OUTPATIENT)
Age: 24
End: 2024-04-18

## 2024-04-18 NOTE — TELEPHONE ENCOUNTER
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  I referred her to [employer] to download that, however, it only goes as far back as 4 years and patient requires her full immunization record history.  She would like to come to the office tomorrow to pick this up, if possible.  Please contact patient if this is not possible.

## 2024-05-14 PROBLEM — Z00.00 WELLNESS EXAMINATION: Status: RESOLVED | Noted: 2023-03-06 | Resolved: 2024-05-14

## 2024-05-14 PROBLEM — Z71.82 EXERCISE COUNSELING: Status: RESOLVED | Noted: 2018-11-21 | Resolved: 2024-05-14

## 2024-07-26 ENCOUNTER — OFFICE VISIT (OUTPATIENT)
Dept: FAMILY MEDICINE CLINIC | Facility: CLINIC | Age: 24
End: 2024-07-26
Payer: COMMERCIAL

## 2024-07-26 VITALS
OXYGEN SATURATION: 97 % | BODY MASS INDEX: 26.22 KG/M2 | SYSTOLIC BLOOD PRESSURE: 96 MMHG | DIASTOLIC BLOOD PRESSURE: 64 MMHG | WEIGHT: 153.6 LBS | HEIGHT: 64 IN | HEART RATE: 84 BPM | TEMPERATURE: 98.7 F

## 2024-07-26 DIAGNOSIS — M79.641 PAIN IN BOTH HANDS: ICD-10-CM

## 2024-07-26 DIAGNOSIS — M79.671 RIGHT FOOT PAIN: ICD-10-CM

## 2024-07-26 DIAGNOSIS — D56.3 BETA THALASSEMIA MINOR: ICD-10-CM

## 2024-07-26 DIAGNOSIS — R53.83 OTHER FATIGUE: ICD-10-CM

## 2024-07-26 DIAGNOSIS — M79.642 PAIN IN BOTH HANDS: ICD-10-CM

## 2024-07-26 DIAGNOSIS — Z00.00 WELLNESS EXAMINATION: ICD-10-CM

## 2024-07-26 DIAGNOSIS — F32.89 OTHER DEPRESSION: ICD-10-CM

## 2024-07-26 DIAGNOSIS — J45.20 MILD INTERMITTENT ASTHMA WITHOUT COMPLICATION: ICD-10-CM

## 2024-07-26 DIAGNOSIS — D50.8 OTHER IRON DEFICIENCY ANEMIA: ICD-10-CM

## 2024-07-26 DIAGNOSIS — E55.9 VITAMIN D INSUFFICIENCY: Primary | ICD-10-CM

## 2024-07-26 PROCEDURE — 99214 OFFICE O/P EST MOD 30 MIN: CPT | Performed by: FAMILY MEDICINE

## 2024-07-26 PROCEDURE — 99395 PREV VISIT EST AGE 18-39: CPT | Performed by: FAMILY MEDICINE

## 2024-07-26 NOTE — PROGRESS NOTES
Ambulatory Visit  Name: Kelley Gallo      : 2000      MRN: [id number]  Encounter Provider: Socorro Sanchez MD  Encounter Date: 2024   Encounter department: [facility]    Assessment & Plan   1. Vitamin D insufficiency  -     Vitamin D 25 hydroxy; Future  2. Other iron deficiency anemia  Assessment & Plan:  I am going to check her CBC and iron panel.  She is to continue on her iron supplement.  Orders:  -     CBC and differential; Future  -     Iron Panel (Includes Ferritin, Iron Sat%, Iron, and TIBC); Future  3. Other fatigue  -     CBC and differential; Future  -     Comprehensive metabolic panel; Future  -     TSH, 3rd generation with Free T4 reflex; Future  4. Right foot pain  Assessment & Plan:  Referral to podiatrist.  Orders:  -     Ambulatory Referral to Podiatry; Future  5. Pain in both hands  Assessment & Plan:  Refer to hand surgeon.  Orders:  -     Ambulatory Referral to Orthopedic Surgery; Future  6. Beta thalassemia minor  7. Wellness examination  Assessment & Plan:  Discussed regular exercise, diet, and immunizations.   8. Mild intermittent asthma without complication  Assessment & Plan:  Stable and well controlled with occasional Albuterol use. Continue same.   9. Other depression  Assessment & Plan:  Not well-controlled but she does not want medication now.  I am going to check a blood work and bring her back in 1 month.         History of Present Illness     She is here today with multiple complaint and wellness exam.  She has been having pain in the medial aspect of her right foot.  Also complaining of pain in her hands bilaterally.  She has been feeling fatigued most of the day.  She feels that she is having problems with concentration and finishing task on time.  She was on Wellbutrin for depression but she stopped taking it because she was feeling that she was doing okay.  She denies any suicidal or homicidal thoughts.      Review of Systems    Constitutional:  Positive for fatigue. Negative for chills and fever.   HENT:  Negative for trouble swallowing.    Eyes:  Negative for visual disturbance.   Respiratory:  Negative for cough and shortness of breath.    Cardiovascular:  Negative for chest pain, palpitations and leg swelling.   Gastrointestinal:  Negative for abdominal pain, constipation and diarrhea.   Endocrine: Negative for cold intolerance and heat intolerance.   Genitourinary:  Negative for difficulty urinating and dysuria.   Musculoskeletal:  Negative for gait problem.   Skin:  Negative for rash.   Neurological:  Negative for dizziness, tremors, seizures and headaches.   Hematological:  Negative for adenopathy.   Psychiatric/Behavioral:  Positive for decreased concentration and dysphoric mood. Negative for behavioral problems.      Past Medical History:   Diagnosis Date   • Anemia    • Asthma    • Exercise counseling 11/21/2018   • Wellness examination 03/06/2023     Past Surgical History:   Procedure Laterality Date   • WISDOM TOOTH EXTRACTION       Family History   Problem Relation Age of Onset   • No Known Problems Mother    • No Known Problems Father    • Breast cancer Maternal Grandmother    • Thyroid disease Maternal Grandmother    • Diabetes Paternal Grandmother      Social History     Tobacco Use   • Smoking status: Never   • Smokeless tobacco: Never   Vaping Use   • Vaping status: Never Used   Substance and Sexual Activity   • Alcohol use: No   • Drug use: No   • Sexual activity: Yes     Partners: Male     Birth control/protection: Condom     Current Outpatient Medications on File Prior to Visit   Medication Sig   • albuterol (ProAir HFA) 90 mcg/act inhaler Inhale 4-6 puffs 4 (four) times a day   • Albuterol Sulfate (ProAir RespiClick) 108 (90 Base) MCG/ACT AEPB Inhale 2 puffs 4 (four) times a day as needed (SOB)   • cetirizine (ZyrTEC) 10 mg tablet    • ipratropium-albuterol (DUO-NEB) 0.5-2.5 mg/3 mL nebulizer solution Every 6 hours    • ergocalciferol (VITAMIN D2) 50,000 units Take 1 capsule (50,000 Units total) by mouth once a week (Patient not taking: Reported on 2/20/2024)   • ibuprofen (MOTRIN) 400 mg tablet Take 1 tablet (400 mg total) by mouth every 6 (six) hours as needed for mild pain (Body aches or fever) for up to 7 days   • ketoconazole (NIZORAL) 2 % shampoo APPLY TO SCALP AT LEAST THREE TIMES WEEKLY FOR 5 MINUTES THEN RINSE OFF. (Patient not taking: Reported on 2/20/2024)   • norethindrone-ethinyl estradiol-iron (Loestrin Fe 1.5/30) 1.5-30 MG-MCG tablet Take 1 tablet by mouth daily (Patient not taking: Reported on 7/26/2024)   • Ventolin  (90 Base) MCG/ACT inhaler INHALE 2 PUFFS EVERY 4 TO 6 HOURS AS NEEDED FOR WHEEZING (Patient not taking: Reported on 7/26/2024)     Allergies   Allergen Reactions   • Dog Epithelium Itching   • Grass Pollen(K-O-R-T-Swt Garret) Itching     Immunization History   Administered Date(s) Administered   • COVID-19 MODERNA VACC 0.5 ML IM 05/03/2021, 06/02/2021   • DTaP 01/10/2003, 03/07/2003, 05/05/2003, 02/09/2004, 11/15/2006   • HPV9 10/24/2019, 01/03/2020, 02/05/2021   • Hep A, ped/adol, 2 dose 10/24/2019, 04/27/2020   • Hep B, Adolescent or Pediatric 01/10/2003, 03/07/2003, 02/09/2004   • Hepatitis A 10/24/2019, 04/27/2020   • HiB 01/10/2003, 03/07/2003, 12/09/2003   • Hib (PRP-T) 01/10/2003   • INFLUENZA 12/03/2007, 01/08/2008, 01/24/2011, 03/14/2012, 11/19/2012   • IPV 01/10/2003, 03/07/2003, 12/09/2003, 11/15/2006   • Influenza Split High Dose Preservative Free IM 12/09/2003   • MMR 12/09/2003, 11/15/2006, 11/12/2015   • Meningococcal Conjugate (MCV4O) 12/29/2014, 11/21/2018   • Meningococcal MCV4P 11/21/2018   • Pneumococcal Conjugate 13-Valent 03/07/2003, 05/05/2003   • Pneumococcal Conjugate PCV 7 01/10/2003, 03/07/2003, 05/05/2003   • Pneumococcal Polysaccharide PPV23 01/10/2003   • Tdap 12/29/2014   • Tuberculin Skin Test-PPD Intradermal 11/21/2018   • Varicella 12/09/2003, 12/03/2007,  "11/12/2015     Objective     BP 96/64 (BP Location: Left arm, Patient Position: Sitting, Cuff Size: Adult)   Pulse 84   Temp 98.7 °F (37.1 °C) (Tympanic)   Ht 5' 4\" (1.626 m)   Wt 69.7 kg (153 lb 9.6 oz)   SpO2 97%   BMI 26.37 kg/m²     Physical Exam  Vitals and nursing note reviewed.   Constitutional:       Appearance: She is well-developed.   HENT:      Head: Normocephalic and atraumatic.   Eyes:      Pupils: Pupils are equal, round, and reactive to light.   Cardiovascular:      Rate and Rhythm: Normal rate and regular rhythm.      Heart sounds: Normal heart sounds.   Pulmonary:      Effort: Pulmonary effort is normal.      Breath sounds: Normal breath sounds.   Abdominal:      General: Bowel sounds are normal.      Palpations: Abdomen is soft.   Musculoskeletal:         General: Tenderness (hand b/l and medial aspect of right foot) present.      Cervical back: Normal range of motion and neck supple.   Lymphadenopathy:      Cervical: No cervical adenopathy.   Skin:     General: Skin is warm.   Neurological:      Mental Status: She is alert and oriented to person, place, and time.       Depression Screening Follow-up Plan: Patient's depression screening was positive with a PHQ-2 score of . Their PHQ-9 score was 8. Patient assessed for underlying major depression. They have no active suicidal ideations. Brief counseling provided and recommend additional follow-up/re-evaluation next office visit.  "

## 2024-07-27 NOTE — ASSESSMENT & PLAN NOTE
Not well-controlled but she does not want medication now.  I am going to check a blood work and bring her back in 1 month.

## 2024-07-30 ENCOUNTER — RA CDI HCC (OUTPATIENT)
Dept: OTHER | Facility: HOSPITAL | Age: 24
End: 2024-07-30

## 2024-08-05 ENCOUNTER — APPOINTMENT (OUTPATIENT)
Dept: LAB | Age: 24
End: 2024-08-05
Payer: COMMERCIAL

## 2024-08-05 DIAGNOSIS — R53.83 OTHER FATIGUE: ICD-10-CM

## 2024-08-05 DIAGNOSIS — E55.9 VITAMIN D INSUFFICIENCY: ICD-10-CM

## 2024-08-05 DIAGNOSIS — D50.8 OTHER IRON DEFICIENCY ANEMIA: ICD-10-CM

## 2024-08-05 LAB
25(OH)D3 SERPL-MCNC: 38.9 NG/ML (ref 30–100)
ALBUMIN SERPL BCG-MCNC: 4 G/DL (ref 3.5–5)
ALP SERPL-CCNC: 45 U/L (ref 34–104)
ALT SERPL W P-5'-P-CCNC: 10 U/L (ref 7–52)
ANION GAP SERPL CALCULATED.3IONS-SCNC: 8 MMOL/L (ref 4–13)
AST SERPL W P-5'-P-CCNC: 13 U/L (ref 13–39)
BASOPHILS # BLD AUTO: 0.04 THOUSANDS/ΜL (ref 0–0.1)
BASOPHILS NFR BLD AUTO: 1 % (ref 0–1)
BILIRUB SERPL-MCNC: 1.86 MG/DL (ref 0.2–1)
BUN SERPL-MCNC: 8 MG/DL (ref 5–25)
CALCIUM SERPL-MCNC: 8.8 MG/DL (ref 8.4–10.2)
CHLORIDE SERPL-SCNC: 107 MMOL/L (ref 96–108)
CO2 SERPL-SCNC: 25 MMOL/L (ref 21–32)
CREAT SERPL-MCNC: 0.7 MG/DL (ref 0.6–1.3)
EOSINOPHIL # BLD AUTO: 0.24 THOUSAND/ΜL (ref 0–0.61)
EOSINOPHIL NFR BLD AUTO: 4 % (ref 0–6)
ERYTHROCYTE [DISTWIDTH] IN BLOOD BY AUTOMATED COUNT: 14.8 % (ref 11.6–15.1)
FERRITIN SERPL-MCNC: 121 NG/ML (ref 11–307)
GFR SERPL CREATININE-BSD FRML MDRD: 124 ML/MIN/1.73SQ M
GLUCOSE P FAST SERPL-MCNC: 81 MG/DL (ref 65–99)
HCT VFR BLD AUTO: 36.8 % (ref 34.8–46.1)
HGB BLD-MCNC: 11.8 G/DL (ref 11.5–15.4)
IMM GRANULOCYTES # BLD AUTO: 0.01 THOUSAND/UL (ref 0–0.2)
IMM GRANULOCYTES NFR BLD AUTO: 0 % (ref 0–2)
IRON SATN MFR SERPL: 64 % (ref 15–50)
IRON SERPL-MCNC: 170 UG/DL (ref 50–212)
LYMPHOCYTES # BLD AUTO: 2.04 THOUSANDS/ΜL (ref 0.6–4.47)
LYMPHOCYTES NFR BLD AUTO: 36 % (ref 14–44)
MCH RBC QN AUTO: 23.9 PG (ref 26.8–34.3)
MCHC RBC AUTO-ENTMCNC: 32.1 G/DL (ref 31.4–37.4)
MCV RBC AUTO: 75 FL (ref 82–98)
MONOCYTES # BLD AUTO: 0.42 THOUSAND/ΜL (ref 0.17–1.22)
MONOCYTES NFR BLD AUTO: 7 % (ref 4–12)
NEUTROPHILS # BLD AUTO: 2.9 THOUSANDS/ΜL (ref 1.85–7.62)
NEUTS SEG NFR BLD AUTO: 52 % (ref 43–75)
NRBC BLD AUTO-RTO: 0 /100 WBCS
PLATELET # BLD AUTO: 310 THOUSANDS/UL (ref 149–390)
PMV BLD AUTO: 10.3 FL (ref 8.9–12.7)
POTASSIUM SERPL-SCNC: 3.8 MMOL/L (ref 3.5–5.3)
PROT SERPL-MCNC: 6.7 G/DL (ref 6.4–8.4)
RBC # BLD AUTO: 4.93 MILLION/UL (ref 3.81–5.12)
SODIUM SERPL-SCNC: 140 MMOL/L (ref 135–147)
TIBC SERPL-MCNC: 264 UG/DL (ref 250–450)
TSH SERPL DL<=0.05 MIU/L-ACNC: 1 UIU/ML (ref 0.45–4.5)
UIBC SERPL-MCNC: 94 UG/DL (ref 155–355)
WBC # BLD AUTO: 5.65 THOUSAND/UL (ref 4.31–10.16)

## 2024-08-05 PROCEDURE — 84443 ASSAY THYROID STIM HORMONE: CPT

## 2024-08-05 PROCEDURE — 36415 COLL VENOUS BLD VENIPUNCTURE: CPT

## 2024-08-05 PROCEDURE — 83550 IRON BINDING TEST: CPT

## 2024-08-05 PROCEDURE — 82728 ASSAY OF FERRITIN: CPT

## 2024-08-05 PROCEDURE — 80053 COMPREHEN METABOLIC PANEL: CPT

## 2024-08-05 PROCEDURE — 85025 COMPLETE CBC W/AUTO DIFF WBC: CPT

## 2024-08-05 PROCEDURE — 83540 ASSAY OF IRON: CPT

## 2024-08-05 PROCEDURE — 82306 VITAMIN D 25 HYDROXY: CPT

## 2024-10-02 ENCOUNTER — OFFICE VISIT (OUTPATIENT)
Dept: FAMILY MEDICINE CLINIC | Facility: CLINIC | Age: 24
End: 2024-10-02
Payer: COMMERCIAL

## 2024-10-02 VITALS
BODY MASS INDEX: 25.78 KG/M2 | OXYGEN SATURATION: 99 % | HEART RATE: 103 BPM | RESPIRATION RATE: 16 BRPM | SYSTOLIC BLOOD PRESSURE: 122 MMHG | DIASTOLIC BLOOD PRESSURE: 60 MMHG | HEIGHT: 64 IN | WEIGHT: 151 LBS | TEMPERATURE: 98.4 F

## 2024-10-02 DIAGNOSIS — J45.20 MILD INTERMITTENT ASTHMA WITHOUT COMPLICATION: ICD-10-CM

## 2024-10-02 DIAGNOSIS — F32.89 OTHER DEPRESSION: Primary | ICD-10-CM

## 2024-10-02 PROCEDURE — 99214 OFFICE O/P EST MOD 30 MIN: CPT | Performed by: NURSE PRACTITIONER

## 2024-10-02 RX ORDER — BUPROPION HYDROCHLORIDE 150 MG/1
150 TABLET ORAL DAILY
Qty: 30 TABLET | Refills: 1 | Status: SHIPPED | OUTPATIENT
Start: 2024-10-02

## 2024-10-02 NOTE — ASSESSMENT & PLAN NOTE
- Not well controlled.  - Will restart Wellbutrin 150 mg daily. Discussed side effects.  - She was referred to Psych for talk therapy. She is also going to look for resources outside of [facility] for therapy.  - Recommend ER for any SI or HI.   - Recommend follow up here in 3-4 weeks.     Orders:    buPROPion (Wellbutrin XL) 150 mg 24 hr tablet; Take 1 tablet (150 mg total) by mouth daily    Ambulatory referral to Psych Services; Future

## 2024-10-02 NOTE — PROGRESS NOTES
Ambulatory Visit  Name: Kelley Gallo      : 2000      MRN: [id number]  Encounter Provider: CAPRICE Finney  Encounter Date: 10/2/2024   Encounter department: [facility]    Assessment & Plan  Other depression  - Not well controlled.  - Will restart Wellbutrin 150 mg daily. Discussed side effects.  - She was referred to Psych for talk therapy. She is also going to look for resources outside of [facility] for therapy.  - Recommend ER for any SI or HI.   - Recommend follow up here in 3-4 weeks.     Orders:    buPROPion (Wellbutrin XL) 150 mg 24 hr tablet; Take 1 tablet (150 mg total) by mouth daily    Ambulatory referral to Psych Services; Future    Mild intermittent asthma without complication  - Well controlled.  - Continue albuterol as needed.  - Will continue to monitor.               History of Present Illness     Patient presents to office today with concerns regarding depression. States that she was previously on Wellbutrin which worked well for her but she stopped it abruptly. Has not taken the medication for several months. Feels her depression is progressively getting worse without the medication. Does endorse thoughts of self-harm. States she has stopped taking care of herself. Also recently had foot surgery and hasn't been able to go to the gym which she thinks is contributing to her depression. She is interested in starting the medication again. Also interested in talk therapy. She denies any other concerns or complaints today.           Review of Systems   Constitutional:  Negative for fatigue and fever.   HENT:  Negative for trouble swallowing.    Eyes:  Negative for visual disturbance.   Respiratory:  Negative for cough and shortness of breath.    Cardiovascular:  Negative for chest pain and palpitations.   Gastrointestinal:  Negative for abdominal pain and blood in stool.   Endocrine: Negative for cold intolerance and heat intolerance.   Genitourinary:  Negative  for difficulty urinating and dysuria.   Musculoskeletal:  Negative for gait problem.   Skin:  Negative for rash.   Neurological:  Negative for dizziness, syncope and headaches.   Hematological:  Negative for adenopathy.   Psychiatric/Behavioral:  Positive for dysphoric mood and self-injury. Negative for behavioral problems.      Past Medical History:   Diagnosis Date    Anemia     Asthma     Exercise counseling 11/21/2018    Wellness examination 03/06/2023     Past Surgical History:   Procedure Laterality Date    WISDOM TOOTH EXTRACTION       Family History   Problem Relation Age of Onset    No Known Problems Mother     No Known Problems Father     Breast cancer Maternal Grandmother     Thyroid disease Maternal Grandmother     Diabetes Paternal Grandmother      Social History     Tobacco Use    Smoking status: Never    Smokeless tobacco: Never   Vaping Use    Vaping status: Never Used   Substance and Sexual Activity    Alcohol use: No    Drug use: No    Sexual activity: Yes     Partners: Male     Birth control/protection: Condom     Current Outpatient Medications on File Prior to Visit   Medication Sig    albuterol (ProAir HFA) 90 mcg/act inhaler Inhale 4-6 puffs 4 (four) times a day    Albuterol Sulfate (ProAir RespiClick) 108 (90 Base) MCG/ACT AEPB Inhale 2 puffs 4 (four) times a day as needed (SOB)    cetirizine (ZyrTEC) 10 mg tablet     ipratropium-albuterol (DUO-NEB) 0.5-2.5 mg/3 mL nebulizer solution Every 6 hours    ergocalciferol (VITAMIN D2) 50,000 units Take 1 capsule (50,000 Units total) by mouth once a week (Patient not taking: Reported on 2/20/2024)    ibuprofen (MOTRIN) 400 mg tablet Take 1 tablet (400 mg total) by mouth every 6 (six) hours as needed for mild pain (Body aches or fever) for up to 7 days    ketoconazole (NIZORAL) 2 % shampoo APPLY TO SCALP AT LEAST THREE TIMES WEEKLY FOR 5 MINUTES THEN RINSE OFF. (Patient not taking: Reported on 2/20/2024)    norethindrone-ethinyl estradiol-iron  "(Loestrin Fe 1.5/30) 1.5-30 MG-MCG tablet Take 1 tablet by mouth daily (Patient not taking: Reported on 7/26/2024)    Ventolin  (90 Base) MCG/ACT inhaler INHALE 2 PUFFS EVERY 4 TO 6 HOURS AS NEEDED FOR WHEEZING (Patient not taking: Reported on 7/26/2024)     Allergies   Allergen Reactions    Dog Epithelium Itching    Grass Pollen(K-O-R-T-Swt Garret) Itching     Immunization History   Administered Date(s) Administered    COVID-19 MODERNA VACC 0.5 ML IM 05/03/2021, 06/02/2021    DTaP 01/10/2003, 03/07/2003, 05/05/2003, 02/09/2004, 11/15/2006    HPV9 10/24/2019, 01/03/2020, 02/05/2021    Hep A, ped/adol, 2 dose 10/24/2019, 04/27/2020    Hep B, Adolescent or Pediatric 01/10/2003, 03/07/2003, 02/09/2004    Hepatitis A 10/24/2019, 04/27/2020    HiB 01/10/2003, 03/07/2003, 12/09/2003    Hib (PRP-T) 01/10/2003    INFLUENZA 12/03/2007, 01/08/2008, 01/24/2011, 03/14/2012, 11/19/2012    IPV 01/10/2003, 03/07/2003, 12/09/2003, 11/15/2006    Influenza Split High Dose Preservative Free IM 12/09/2003    MMR 12/09/2003, 11/15/2006, 11/12/2015    Meningococcal Conjugate (MCV4O) 12/29/2014, 11/21/2018    Meningococcal MCV4P 11/21/2018    Pneumococcal Conjugate 13-Valent 03/07/2003, 05/05/2003    Pneumococcal Conjugate PCV 7 01/10/2003, 03/07/2003, 05/05/2003    Pneumococcal Polysaccharide PPV23 01/10/2003    Tdap 12/29/2014    Tuberculin Skin Test-PPD Intradermal 11/21/2018    Varicella 12/09/2003, 12/03/2007, 11/12/2015     Objective     /60 (BP Location: Left arm, Patient Position: Sitting, Cuff Size: Standard)   Pulse 103   Temp 98.4 °F (36.9 °C) (Tympanic)   Resp 16   Ht 5' 4\" (1.626 m)   Wt 68.5 kg (151 lb)   SpO2 99%   BMI 25.92 kg/m²     Physical Exam  Vitals and nursing note reviewed.   Constitutional:       Appearance: Normal appearance. She is well-developed.   HENT:      Head: Normocephalic and atraumatic.      Right Ear: External ear normal.      Left Ear: External ear normal.   Eyes:      " Conjunctiva/sclera: Conjunctivae normal.   Cardiovascular:      Rate and Rhythm: Normal rate and regular rhythm.      Heart sounds: Normal heart sounds.   Pulmonary:      Effort: Pulmonary effort is normal.      Breath sounds: Normal breath sounds.   Musculoskeletal:         General: Normal range of motion.      Cervical back: Normal range of motion.   Skin:     General: Skin is warm and dry.   Neurological:      Mental Status: She is alert and oriented to person, place, and time.   Psychiatric:         Mood and Affect: Affect is tearful.         Behavior: Behavior is cooperative.

## 2024-10-03 ENCOUNTER — TELEPHONE (OUTPATIENT)
Age: 24
End: 2024-10-03

## 2024-10-03 NOTE — TELEPHONE ENCOUNTER
Contacted patient in regards to Routine Referral in attempts to verify patient's needs of services and add patient to proper wait list. Writer left vm to call intake at [phone]    Please add patient to proper WL(s)    Attempt #1

## 2024-10-08 NOTE — TELEPHONE ENCOUNTER
Contacted patient in regards to Routine Referral in attempts to verify patient's needs of services and add patient to proper wait list. Writer left vm to call intake at [phone]    Please add patient to proper WL(s)    Attempt #2

## 2024-10-24 DIAGNOSIS — F32.89 OTHER DEPRESSION: ICD-10-CM

## 2024-10-25 RX ORDER — BUPROPION HYDROCHLORIDE 150 MG/1
150 TABLET ORAL DAILY
Qty: 90 TABLET | Refills: 1 | Status: SHIPPED | OUTPATIENT
Start: 2024-10-25

## 2024-11-27 ENCOUNTER — OFFICE VISIT (OUTPATIENT)
Dept: FAMILY MEDICINE CLINIC | Facility: CLINIC | Age: 24
End: 2024-11-27
Payer: COMMERCIAL

## 2024-11-27 VITALS
DIASTOLIC BLOOD PRESSURE: 64 MMHG | OXYGEN SATURATION: 98 % | RESPIRATION RATE: 16 BRPM | TEMPERATURE: 98.5 F | HEART RATE: 91 BPM | SYSTOLIC BLOOD PRESSURE: 124 MMHG | WEIGHT: 155 LBS | BODY MASS INDEX: 26.46 KG/M2 | HEIGHT: 64 IN

## 2024-11-27 DIAGNOSIS — F32.2 SEVERE MAJOR DEPRESSIVE DISORDER (HCC): Primary | ICD-10-CM

## 2024-11-27 PROCEDURE — 99213 OFFICE O/P EST LOW 20 MIN: CPT | Performed by: NURSE PRACTITIONER

## 2024-11-27 RX ORDER — BUPROPION HYDROCHLORIDE 150 MG/1
150 TABLET ORAL DAILY
Qty: 90 TABLET | Refills: 1 | Status: SHIPPED | OUTPATIENT
Start: 2024-11-27

## 2024-11-27 NOTE — ASSESSMENT & PLAN NOTE
Orders:    buPROPion (WELLBUTRIN XL) 150 mg 24 hr tablet; Take 1 tablet (150 mg total) by mouth daily

## 2024-11-27 NOTE — PROGRESS NOTES
Name: Kelley Gallo      : 2000      MRN: [id number]  Encounter Provider: CAPRICE Finney  Encounter Date: 2024   Encounter department:  [facility]    Assessment & Plan  Severe major depressive disorder (HCC)  - Improving.  - Continue Wellbutrin 150 mg daily.  - Recommend follow up in 2 months or sooner if needed.    Orders:    buPROPion (WELLBUTRIN XL) 150 mg 24 hr tablet; Take 1 tablet (150 mg total) by mouth daily         History of Present Illness     Patient presents to office today for follow up regarding depression. She was restarted on Wellbutrin 150 mg daily. Feels her symptoms are improving. She reached out regarding therapy/counseling and they discussed putting her on a wait list. She denies any other concerns or complaints today.            Review of Systems   Constitutional:  Negative for fatigue and fever.   HENT:  Negative for trouble swallowing.    Eyes:  Negative for visual disturbance.   Respiratory:  Negative for cough and shortness of breath.    Cardiovascular:  Negative for chest pain and palpitations.   Gastrointestinal:  Negative for abdominal pain and blood in stool.   Endocrine: Negative for cold intolerance and heat intolerance.   Genitourinary:  Negative for difficulty urinating and dysuria.   Musculoskeletal:  Negative for gait problem.   Skin:  Negative for rash.   Neurological:  Negative for dizziness, syncope and headaches.   Hematological:  Negative for adenopathy.   Psychiatric/Behavioral:  Negative for behavioral problems.      Past Medical History:   Diagnosis Date    Anemia     Asthma     Exercise counseling 2018    Wellness examination 2023     Past Surgical History:   Procedure Laterality Date    WISDOM TOOTH EXTRACTION       Family History   Problem Relation Age of Onset    No Known Problems Mother     No Known Problems Father     Breast cancer Maternal Grandmother     Thyroid disease Maternal Grandmother     Diabetes  Paternal Grandmother      Social History     Tobacco Use    Smoking status: Never    Smokeless tobacco: Never   Vaping Use    Vaping status: Never Used   Substance and Sexual Activity    Alcohol use: No    Drug use: No    Sexual activity: Yes     Partners: Male     Birth control/protection: Condom     Current Outpatient Medications on File Prior to Visit   Medication Sig    albuterol (ProAir HFA) 90 mcg/act inhaler Inhale 4-6 puffs 4 (four) times a day    Albuterol Sulfate (ProAir RespiClick) 108 (90 Base) MCG/ACT AEPB Inhale 2 puffs 4 (four) times a day as needed (SOB)    buPROPion (WELLBUTRIN XL) 150 mg 24 hr tablet TAKE 1 TABLET BY MOUTH EVERY DAY    cetirizine (ZyrTEC) 10 mg tablet     ipratropium-albuterol (DUO-NEB) 0.5-2.5 mg/3 mL nebulizer solution Every 6 hours    ergocalciferol (VITAMIN D2) 50,000 units Take 1 capsule (50,000 Units total) by mouth once a week (Patient not taking: Reported on 2/20/2024)    ibuprofen (MOTRIN) 400 mg tablet Take 1 tablet (400 mg total) by mouth every 6 (six) hours as needed for mild pain (Body aches or fever) for up to 7 days    ketoconazole (NIZORAL) 2 % shampoo APPLY TO SCALP AT LEAST THREE TIMES WEEKLY FOR 5 MINUTES THEN RINSE OFF. (Patient not taking: Reported on 2/20/2024)    norethindrone-ethinyl estradiol-iron (Loestrin Fe 1.5/30) 1.5-30 MG-MCG tablet Take 1 tablet by mouth daily (Patient not taking: Reported on 11/27/2024)    Ventolin  (90 Base) MCG/ACT inhaler INHALE 2 PUFFS EVERY 4 TO 6 HOURS AS NEEDED FOR WHEEZING (Patient not taking: No sig reported)     Allergies   Allergen Reactions    Dog Epithelium Itching    Grass Pollen(K-O-R-T-Swt Garret) Itching     Immunization History   Administered Date(s) Administered    COVID-19 MODERNA VACC 0.5 ML IM 05/03/2021, 06/02/2021    DTaP 01/10/2003, 03/07/2003, 05/05/2003, 02/09/2004, 11/15/2006    HPV9 10/24/2019, 01/03/2020, 02/05/2021    Hep A, ped/adol, 2 dose 10/24/2019, 04/27/2020    Hep B, Adolescent or Pediatric  "01/10/2003, 03/07/2003, 02/09/2004    Hepatitis A 10/24/2019, 04/27/2020    HiB 01/10/2003, 03/07/2003, 12/09/2003    Hib (PRP-T) 01/10/2003    INFLUENZA 12/03/2007, 01/08/2008, 01/24/2011, 03/14/2012, 11/19/2012    IPV 01/10/2003, 03/07/2003, 12/09/2003, 11/15/2006    Influenza Split High Dose Preservative Free IM 12/09/2003    MMR 12/09/2003, 11/15/2006, 11/12/2015    Meningococcal Conjugate (MCV4O) 12/29/2014, 11/21/2018    Meningococcal MCV4P 11/21/2018    Pneumococcal Conjugate 13-Valent 03/07/2003, 05/05/2003    Pneumococcal Conjugate PCV 7 01/10/2003, 03/07/2003, 05/05/2003    Pneumococcal Polysaccharide PPV23 01/10/2003    Tdap 12/29/2014    Tuberculin Skin Test-PPD Intradermal 11/21/2018    Varicella 12/09/2003, 12/03/2007, 11/12/2015     Objective   /64 (BP Location: Left arm, Patient Position: Sitting, Cuff Size: Adult)   Pulse 91   Temp 98.5 °F (36.9 °C) (Tympanic)   Resp 16   Ht 5' 4\" (1.626 m)   Wt 70.3 kg (155 lb)   SpO2 98%   BMI 26.61 kg/m²     Physical Exam  Vitals and nursing note reviewed.   Constitutional:       Appearance: Normal appearance. She is well-developed.   HENT:      Head: Normocephalic and atraumatic.      Right Ear: External ear normal.      Left Ear: External ear normal.   Eyes:      Conjunctiva/sclera: Conjunctivae normal.   Cardiovascular:      Rate and Rhythm: Normal rate and regular rhythm.      Heart sounds: Normal heart sounds.   Pulmonary:      Effort: Pulmonary effort is normal.      Breath sounds: Normal breath sounds.   Musculoskeletal:         General: Normal range of motion.      Cervical back: Normal range of motion.   Skin:     General: Skin is warm and dry.   Neurological:      Mental Status: She is alert and oriented to person, place, and time.   Psychiatric:         Mood and Affect: Mood normal.         Behavior: Behavior normal.         "

## 2024-11-27 NOTE — ASSESSMENT & PLAN NOTE
- Improving.  - Continue Wellbutrin 150 mg daily.  - Recommend follow up in 2 months or sooner if needed.    Orders:    buPROPion (WELLBUTRIN XL) 150 mg 24 hr tablet; Take 1 tablet (150 mg total) by mouth daily

## 2025-01-22 ENCOUNTER — TELEPHONE (OUTPATIENT)
Age: 25
End: 2025-01-22

## 2025-01-22 NOTE — TELEPHONE ENCOUNTER
Contacted patient off of Medication Management  to verify needs of services in attempts to offer patient an appointment. LVM for patient to contact intake dept  in regards to offer possible appointment virtual at [phone] opt 3. 1st attempt.

## 2025-01-27 ENCOUNTER — OFFICE VISIT (OUTPATIENT)
Dept: FAMILY MEDICINE CLINIC | Facility: CLINIC | Age: 25
End: 2025-01-27
Payer: COMMERCIAL

## 2025-01-27 VITALS
DIASTOLIC BLOOD PRESSURE: 74 MMHG | OXYGEN SATURATION: 99 % | HEART RATE: 84 BPM | TEMPERATURE: 98.1 F | BODY MASS INDEX: 27.28 KG/M2 | SYSTOLIC BLOOD PRESSURE: 120 MMHG | HEIGHT: 64 IN | RESPIRATION RATE: 16 BRPM | WEIGHT: 159.8 LBS

## 2025-01-27 DIAGNOSIS — D50.8 OTHER IRON DEFICIENCY ANEMIA: ICD-10-CM

## 2025-01-27 DIAGNOSIS — F32.2 SEVERE MAJOR DEPRESSIVE DISORDER (HCC): Primary | ICD-10-CM

## 2025-01-27 DIAGNOSIS — R53.83 OTHER FATIGUE: ICD-10-CM

## 2025-01-27 DIAGNOSIS — F32.89 OTHER DEPRESSION: ICD-10-CM

## 2025-01-27 DIAGNOSIS — J45.20 MILD INTERMITTENT ASTHMA WITHOUT COMPLICATION: ICD-10-CM

## 2025-01-27 DIAGNOSIS — R23.3 EASY BRUISING: ICD-10-CM

## 2025-01-27 PROCEDURE — 99214 OFFICE O/P EST MOD 30 MIN: CPT | Performed by: FAMILY MEDICINE

## 2025-01-27 RX ORDER — ESCITALOPRAM OXALATE 5 MG/1
5 TABLET ORAL DAILY
Qty: 30 TABLET | Refills: 3 | Status: SHIPPED | OUTPATIENT
Start: 2025-01-27

## 2025-01-27 NOTE — ASSESSMENT & PLAN NOTE
Improving. Continue wellbutrin 150 mg tablets once daily  Begin lexapro (escitalopram) 5 mg tablets once daily  Patient advised it could take up to a month to see full positive effects of the medication    Orders:  •  escitalopram (LEXAPRO) 5 mg tablet; Take 1 tablet (5 mg total) by mouth daily  •  CBC and differential; Future  •  Comprehensive metabolic panel; Future  •  TSH, 3rd generation with Free T4 reflex; Future

## 2025-01-27 NOTE — ASSESSMENT & PLAN NOTE
Continue iron supplement  Orders:  •  CBC and differential; Future  •  TSH, 3rd generation with Free T4 reflex; Future  •  Iron Panel (Includes Ferritin, Iron Sat%, Iron, and TIBC); Future

## 2025-01-27 NOTE — PROGRESS NOTES
Name: Kelley Gallo      : 2000      MRN: [id number]  Encounter Provider: Socorro Sanchez MD  Encounter Date: 2025   Encounter department: [facility]    Assessment & Plan  Severe major depressive disorder (HCC)  Improving. Continue wellbutrin 150 mg tablets once daily  Begin lexapro (escitalopram) 5 mg tablets once daily  Patient advised it could take up to a month to see full positive effects of the medication    Orders:  •  escitalopram (LEXAPRO) 5 mg tablet; Take 1 tablet (5 mg total) by mouth daily  •  CBC and differential; Future  •  Comprehensive metabolic panel; Future  •  TSH, 3rd generation with Free T4 reflex; Future    Other iron deficiency anemia  Continue iron supplement  Orders:  •  CBC and differential; Future  •  TSH, 3rd generation with Free T4 reflex; Future  •  Iron Panel (Includes Ferritin, Iron Sat%, Iron, and TIBC); Future    Easy bruising    Orders:  •  PT/INR Home Monitor  •  APTT; Future    Other fatigue    Orders:  •  CBC and differential; Future    Mild intermittent asthma without complication  - Well controlled.  - Continue albuterol as needed.  - Will continue to monitor.            History of Present Illness     24 year old female following up on wellbutrin. She reports she thinks it is helping before. She rpeorts she used to take it at a higher dose. She reports she stopped her medications cold turkey, which lead to recurrence of her depressive. She has not tried anything else for depression in the past. She reports she is still having some symptoms. She reports no issues with sleep. She enjoys going to the gym and spending time with friends for fun. She finds her hobbies fun. She still feels like she does not have a lot of energy throughout the day. She reports no issues with eating or appetite. She denies any suicidal or homicidal ideations.    Depression  Associated symptoms include fatigue. Pertinent negatives include no abdominal pain, chest  pain or fever.     Review of Systems   Constitutional:  Positive for fatigue. Negative for fever.   Respiratory:  Negative for shortness of breath.    Cardiovascular:  Negative for chest pain and palpitations.   Gastrointestinal:  Negative for abdominal pain.   Neurological:  Negative for dizziness and syncope.   Psychiatric/Behavioral:  Positive for depression and dysphoric mood.      Past Medical History:   Diagnosis Date   • Anemia    • Asthma    • Exercise counseling 11/21/2018   • Wellness examination 03/06/2023     Past Surgical History:   Procedure Laterality Date   • WISDOM TOOTH EXTRACTION       Family History   Problem Relation Age of Onset   • No Known Problems Mother    • No Known Problems Father    • Breast cancer Maternal Grandmother    • Thyroid disease Maternal Grandmother    • Diabetes Paternal Grandmother      Social History     Tobacco Use   • Smoking status: Never   • Smokeless tobacco: Never   Vaping Use   • Vaping status: Never Used   Substance and Sexual Activity   • Alcohol use: No   • Drug use: No   • Sexual activity: Yes     Partners: Male     Birth control/protection: Condom     Current Outpatient Medications on File Prior to Visit   Medication Sig   • albuterol (ProAir HFA) 90 mcg/act inhaler Inhale 4-6 puffs 4 (four) times a day   • Albuterol Sulfate (ProAir RespiClick) 108 (90 Base) MCG/ACT AEPB Inhale 2 puffs 4 (four) times a day as needed (SOB)   • buPROPion (WELLBUTRIN XL) 150 mg 24 hr tablet Take 1 tablet (150 mg total) by mouth daily   • cetirizine (ZyrTEC) 10 mg tablet    • ipratropium-albuterol (DUO-NEB) 0.5-2.5 mg/3 mL nebulizer solution Every 6 hours   • ketoconazole (NIZORAL) 2 % shampoo    • ergocalciferol (VITAMIN D2) 50,000 units Take 1 capsule (50,000 Units total) by mouth once a week (Patient not taking: Reported on 2/20/2024)   • ibuprofen (MOTRIN) 400 mg tablet Take 1 tablet (400 mg total) by mouth every 6 (six) hours as needed for mild pain (Body aches or fever) for up  "to 7 days   • norethindrone-ethinyl estradiol-iron (Loestrin Fe 1.5/30) 1.5-30 MG-MCG tablet Take 1 tablet by mouth daily (Patient not taking: Reported on 11/27/2024)   • Ventolin  (90 Base) MCG/ACT inhaler INHALE 2 PUFFS EVERY 4 TO 6 HOURS AS NEEDED FOR WHEEZING (Patient not taking: No sig reported)     Allergies   Allergen Reactions   • Dog Epithelium Itching   • Grass Pollen(K-O-R-T-Swt Garret) Itching     Immunization History   Administered Date(s) Administered   • COVID-19 MODERNA VACC 0.5 ML IM 05/03/2021, 06/02/2021   • DTaP 01/10/2003, 03/07/2003, 05/05/2003, 02/09/2004, 11/15/2006   • HPV9 10/24/2019, 01/03/2020, 02/05/2021   • Hep A, ped/adol, 2 dose 10/24/2019, 04/27/2020   • Hep B, Adolescent or Pediatric 01/10/2003, 03/07/2003, 02/09/2004   • Hepatitis A 10/24/2019, 04/27/2020   • HiB 01/10/2003, 03/07/2003, 12/09/2003   • Hib (PRP-T) 01/10/2003   • INFLUENZA 12/03/2007, 01/08/2008, 01/24/2011, 03/14/2012, 11/19/2012   • IPV 01/10/2003, 03/07/2003, 12/09/2003, 11/15/2006   • Influenza Split High Dose Preservative Free IM 12/09/2003   • MMR 12/09/2003, 11/15/2006, 11/12/2015   • Meningococcal Conjugate (MCV4O) 12/29/2014, 11/21/2018   • Meningococcal MCV4P 11/21/2018   • Pneumococcal Conjugate 13-Valent 03/07/2003, 05/05/2003   • Pneumococcal Conjugate PCV 7 01/10/2003, 03/07/2003, 05/05/2003   • Pneumococcal Polysaccharide PPV23 01/10/2003   • Tdap 12/29/2014   • Tuberculin Skin Test-PPD Intradermal 11/21/2018   • Varicella 12/09/2003, 12/03/2007, 11/12/2015     Objective   /74 (BP Location: Left arm, Patient Position: Sitting, Cuff Size: Standard)   Pulse 84   Temp 98.1 °F (36.7 °C) (Tympanic)   Resp 16   Ht 5' 4\" (1.626 m)   Wt 72.5 kg (159 lb 12.8 oz)   SpO2 99%   BMI 27.43 kg/m²     Physical Exam  Vitals and nursing note reviewed.   Constitutional:       Appearance: She is well-developed.   HENT:      Head: Normocephalic and atraumatic.   Eyes:      Pupils: Pupils are equal, round, " and reactive to light.   Cardiovascular:      Rate and Rhythm: Normal rate and regular rhythm.      Heart sounds: Normal heart sounds. No murmur heard.     No friction rub. No gallop.   Pulmonary:      Effort: Pulmonary effort is normal. No respiratory distress.      Breath sounds: Normal breath sounds. No wheezing, rhonchi or rales.   Abdominal:      General: Bowel sounds are normal.      Palpations: Abdomen is soft.   Musculoskeletal:      Cervical back: Normal range of motion and neck supple.   Lymphadenopathy:      Cervical: No cervical adenopathy.   Skin:     General: Skin is warm and dry.   Neurological:      General: No focal deficit present.      Mental Status: She is alert and oriented to person, place, and time.   Psychiatric:         Mood and Affect: Mood normal.         Behavior: Behavior normal.

## 2025-02-02 PROBLEM — F32.A DEPRESSION: Status: RESOLVED | Noted: 2021-11-05 | Resolved: 2025-02-02

## 2025-02-18 DIAGNOSIS — F32.2 SEVERE MAJOR DEPRESSIVE DISORDER (HCC): ICD-10-CM

## 2025-02-19 RX ORDER — ESCITALOPRAM OXALATE 5 MG/1
5 TABLET ORAL DAILY
Qty: 90 TABLET | Refills: 0 | Status: SHIPPED | OUTPATIENT
Start: 2025-02-19

## 2025-02-25 ENCOUNTER — ANNUAL EXAM (OUTPATIENT)
Age: 25
End: 2025-02-25
Payer: COMMERCIAL

## 2025-02-25 ENCOUNTER — APPOINTMENT (OUTPATIENT)
Dept: LAB | Facility: CLINIC | Age: 25
End: 2025-02-25
Payer: COMMERCIAL

## 2025-02-25 VITALS
DIASTOLIC BLOOD PRESSURE: 64 MMHG | BODY MASS INDEX: 26.05 KG/M2 | HEIGHT: 64 IN | SYSTOLIC BLOOD PRESSURE: 104 MMHG | WEIGHT: 152.6 LBS

## 2025-02-25 DIAGNOSIS — Z11.3 SCREENING FOR STD (SEXUALLY TRANSMITTED DISEASE): ICD-10-CM

## 2025-02-25 DIAGNOSIS — R53.83 OTHER FATIGUE: ICD-10-CM

## 2025-02-25 DIAGNOSIS — R23.3 EASY BRUISING: ICD-10-CM

## 2025-02-25 DIAGNOSIS — Z01.419 ENCOUNTER FOR ANNUAL ROUTINE GYNECOLOGICAL EXAMINATION: Primary | ICD-10-CM

## 2025-02-25 DIAGNOSIS — D50.8 OTHER IRON DEFICIENCY ANEMIA: ICD-10-CM

## 2025-02-25 DIAGNOSIS — F32.2 SEVERE MAJOR DEPRESSIVE DISORDER (HCC): ICD-10-CM

## 2025-02-25 LAB
ALBUMIN SERPL BCG-MCNC: 4.4 G/DL (ref 3.5–5)
ALP SERPL-CCNC: 52 U/L (ref 34–104)
ALT SERPL W P-5'-P-CCNC: 10 U/L (ref 7–52)
ANION GAP SERPL CALCULATED.3IONS-SCNC: 11 MMOL/L (ref 4–13)
APTT PPP: 29 SECONDS (ref 23–34)
AST SERPL W P-5'-P-CCNC: 17 U/L (ref 13–39)
BASOPHILS # BLD AUTO: 0.04 THOUSANDS/ÂΜL (ref 0–0.1)
BASOPHILS NFR BLD AUTO: 1 % (ref 0–1)
BILIRUB SERPL-MCNC: 1.1 MG/DL (ref 0.2–1)
BUN SERPL-MCNC: 10 MG/DL (ref 5–25)
CALCIUM SERPL-MCNC: 9.2 MG/DL (ref 8.4–10.2)
CHLORIDE SERPL-SCNC: 105 MMOL/L (ref 96–108)
CO2 SERPL-SCNC: 22 MMOL/L (ref 21–32)
CREAT SERPL-MCNC: 0.73 MG/DL (ref 0.6–1.3)
EOSINOPHIL # BLD AUTO: 0.14 THOUSAND/ÂΜL (ref 0–0.61)
EOSINOPHIL NFR BLD AUTO: 2 % (ref 0–6)
ERYTHROCYTE [DISTWIDTH] IN BLOOD BY AUTOMATED COUNT: 14.6 % (ref 11.6–15.1)
FERRITIN SERPL-MCNC: 166 NG/ML (ref 11–307)
GFR SERPL CREATININE-BSD FRML MDRD: 117 ML/MIN/1.73SQ M
GLUCOSE SERPL-MCNC: 75 MG/DL (ref 65–140)
HCT VFR BLD AUTO: 37.6 % (ref 34.8–46.1)
HGB BLD-MCNC: 12 G/DL (ref 11.5–15.4)
IMM GRANULOCYTES # BLD AUTO: 0.02 THOUSAND/UL (ref 0–0.2)
IMM GRANULOCYTES NFR BLD AUTO: 0 % (ref 0–2)
IRON SATN MFR SERPL: 39 % (ref 15–50)
IRON SERPL-MCNC: 115 UG/DL (ref 50–212)
LYMPHOCYTES # BLD AUTO: 1.65 THOUSANDS/ÂΜL (ref 0.6–4.47)
LYMPHOCYTES NFR BLD AUTO: 22 % (ref 14–44)
MCH RBC QN AUTO: 23.6 PG (ref 26.8–34.3)
MCHC RBC AUTO-ENTMCNC: 31.9 G/DL (ref 31.4–37.4)
MCV RBC AUTO: 74 FL (ref 82–98)
MONOCYTES # BLD AUTO: 0.62 THOUSAND/ÂΜL (ref 0.17–1.22)
MONOCYTES NFR BLD AUTO: 8 % (ref 4–12)
NEUTROPHILS # BLD AUTO: 5.15 THOUSANDS/ÂΜL (ref 1.85–7.62)
NEUTS SEG NFR BLD AUTO: 67 % (ref 43–75)
NRBC BLD AUTO-RTO: 0 /100 WBCS
PLATELET # BLD AUTO: 368 THOUSANDS/UL (ref 149–390)
PMV BLD AUTO: 10.1 FL (ref 8.9–12.7)
POTASSIUM SERPL-SCNC: 3.7 MMOL/L (ref 3.5–5.3)
PROT SERPL-MCNC: 7.6 G/DL (ref 6.4–8.4)
RBC # BLD AUTO: 5.08 MILLION/UL (ref 3.81–5.12)
SODIUM SERPL-SCNC: 138 MMOL/L (ref 135–147)
TIBC SERPL-MCNC: 292.6 UG/DL (ref 250–450)
TRANSFERRIN SERPL-MCNC: 209 MG/DL (ref 203–362)
TSH SERPL DL<=0.05 MIU/L-ACNC: 1.88 UIU/ML (ref 0.45–4.5)
UIBC SERPL-MCNC: 178 UG/DL (ref 155–355)
WBC # BLD AUTO: 7.62 THOUSAND/UL (ref 4.31–10.16)

## 2025-02-25 PROCEDURE — 84443 ASSAY THYROID STIM HORMONE: CPT

## 2025-02-25 PROCEDURE — 85025 COMPLETE CBC W/AUTO DIFF WBC: CPT

## 2025-02-25 PROCEDURE — 82728 ASSAY OF FERRITIN: CPT

## 2025-02-25 PROCEDURE — S0612 ANNUAL GYNECOLOGICAL EXAMINA: HCPCS | Performed by: OBSTETRICS & GYNECOLOGY

## 2025-02-25 PROCEDURE — 36415 COLL VENOUS BLD VENIPUNCTURE: CPT

## 2025-02-25 PROCEDURE — 85730 THROMBOPLASTIN TIME PARTIAL: CPT

## 2025-02-25 PROCEDURE — 80053 COMPREHEN METABOLIC PANEL: CPT

## 2025-02-25 PROCEDURE — 83550 IRON BINDING TEST: CPT

## 2025-02-25 PROCEDURE — 83540 ASSAY OF IRON: CPT

## 2025-02-26 NOTE — PROGRESS NOTES
"Assessment/Plan:     1. Encounter for annual routine gynecological examination (Primary)    - Chlamydia/GC amplified DNA by PCR    2. Screening for STD (sexually transmitted disease)          Subjective      Kelley Gallo is a 24 y.o. female who presents for annual exam. Periods are regular.  She denies any breast or urinary concerns.  Accepts STD screening.    Current contraception: condoms  History of abnormal Pap smear: no  Regular self breast exam: yes  History of abnormal mammogram: no  History of abnormal lipids: no    Menstrual History:  OB History          0    Para   0    Term   0       0    AB   0    Living   0         SAB   0    IAB   0    Ectopic   0    Multiple   0    Live Births   0          Menarche age: 14  Patient's last menstrual period was 2025 (exact date).  Period Cycle (Days): 28  Period Duration (Days): 5  Period Pattern: Regular  Menstrual Flow: Heavy, Light (Heavy x2-3 days)  Menstrual Control: Maxi pad, Tampon  Menstrual Control Change Freq (Hours): 5  Dysmenorrhea: (!) Severe (1st 2 days severe)  Dysmenorrhea Symptoms: Cramping, Diarrhea, Other (Comment), Headache (breast pain, bloating)    Past Medical History:   Diagnosis Date    Anemia     Asthma     Exercise counseling 2018    Wellness examination 2023       Family History   Problem Relation Age of Onset    No Known Problems Mother     No Known Problems Father     Breast cancer Maternal Grandmother     Thyroid disease Maternal Grandmother     Diabetes Paternal Grandmother      The following portions of the patient's history were reviewed and updated as appropriate: allergies, current medications, past family history, past medical history, past social history, past surgical history, and problem list.    Review of Systems  Pertinent items are noted in HPI.      Objective      /64 (BP Location: Right arm, Patient Position: Sitting, Cuff Size: Large)   Ht 5' 4\" (1.626 m)   Wt 69.2 kg (152 lb 9.6 oz)   " LMP 02/02/2025 (Exact Date)   BMI 26.19 kg/m²     General:   alert and oriented, in no acute distress   Heart:  Breasts: regular rate and rhythm  appear normal, no suspicious masses, no skin or nipple changes or axillary nodes.   Lungs: Effort normal   Abdomen: soft, non-tender, without masses or organomegaly   Vulva: normal   Vagina: normal mucosa   Cervix: no lesions   Uterus: normal size, mobile, non-tender   Adnexa:  Bladder: normal adnexa and no mass, fullness, tenderness  Non-tender

## 2025-02-28 ENCOUNTER — OFFICE VISIT (OUTPATIENT)
Dept: FAMILY MEDICINE CLINIC | Facility: CLINIC | Age: 25
End: 2025-02-28
Payer: COMMERCIAL

## 2025-02-28 ENCOUNTER — TELEPHONE (OUTPATIENT)
Age: 25
End: 2025-02-28

## 2025-02-28 VITALS
WEIGHT: 155 LBS | RESPIRATION RATE: 16 BRPM | OXYGEN SATURATION: 99 % | HEIGHT: 64 IN | HEART RATE: 101 BPM | SYSTOLIC BLOOD PRESSURE: 110 MMHG | BODY MASS INDEX: 26.46 KG/M2 | DIASTOLIC BLOOD PRESSURE: 72 MMHG | TEMPERATURE: 98.5 F

## 2025-02-28 DIAGNOSIS — M79.641 PAIN IN BOTH HANDS: ICD-10-CM

## 2025-02-28 DIAGNOSIS — M79.642 PAIN IN BOTH HANDS: ICD-10-CM

## 2025-02-28 DIAGNOSIS — R41.840 ATTENTION OR CONCENTRATION DEFICIT: Primary | ICD-10-CM

## 2025-02-28 DIAGNOSIS — F32.2 SEVERE MAJOR DEPRESSIVE DISORDER (HCC): ICD-10-CM

## 2025-02-28 PROCEDURE — 99214 OFFICE O/P EST MOD 30 MIN: CPT | Performed by: FAMILY MEDICINE

## 2025-02-28 RX ORDER — BUPROPION HYDROCHLORIDE 300 MG/1
300 TABLET ORAL DAILY
Qty: 90 TABLET | Refills: 1 | Status: SHIPPED | OUTPATIENT
Start: 2025-02-28 | End: 2025-02-28 | Stop reason: SDUPTHER

## 2025-02-28 RX ORDER — BUPROPION HYDROCHLORIDE 150 MG/1
150 TABLET ORAL DAILY
Qty: 90 TABLET | Refills: 1 | Status: SHIPPED | OUTPATIENT
Start: 2025-02-28

## 2025-02-28 RX ORDER — BUPROPION HYDROCHLORIDE 150 MG/1
150 TABLET ORAL DAILY
Qty: 90 TABLET | Refills: 1 | Status: SHIPPED | OUTPATIENT
Start: 2025-02-28 | End: 2025-02-28 | Stop reason: SDUPTHER

## 2025-02-28 RX ORDER — DEXTROAMPHETAMINE SACCHARATE, AMPHETAMINE ASPARTATE MONOHYDRATE, DEXTROAMPHETAMINE SULFATE AND AMPHETAMINE SULFATE 2.5; 2.5; 2.5; 2.5 MG/1; MG/1; MG/1; MG/1
10 CAPSULE, EXTENDED RELEASE ORAL EVERY MORNING
Qty: 30 CAPSULE | Refills: 0 | Status: SHIPPED | OUTPATIENT
Start: 2025-02-28

## 2025-02-28 NOTE — ASSESSMENT & PLAN NOTE
- Referral placed to orthopedic hand surgery for further evaluation.   Orders:  •  Ambulatory Referral to Orthopedic Surgery; Future

## 2025-02-28 NOTE — PROGRESS NOTES
Name: Kelley Gallo      : 2000      MRN: [id number]  Encounter Provider: Socorro Sanchez MD  Encounter Date: 2025   Encounter department: [facility]  :  Assessment & Plan  Severe major depressive disorder (HCC)  - Stable on Wellbutrin. Continue same.  - Will continue to monitor symptoms.  - Follow up in 1 month.          Attention or concentration deficit  - Plan to start on Adderall 10mg XR daily. Advised of potential side effects.  - Will continue to monitor symptoms.  - Follow up in 1 month.   Orders:  •  amphetamine-dextroamphetamine (ADDERALL XR, 10MG,) 10 MG 24 hr capsule; Take 1 capsule (10 mg total) by mouth every morning Max Daily Amount: 10 mg    Pain in both hands  - Referral placed to orthopedic hand surgery for further evaluation.   Orders:  •  Ambulatory Referral to Orthopedic Surgery; Future           History of Present Illness   Patient with PMH of depression presenting to the office for follow up. She was started on Lexapro at her last visit and states she took it for 2 days and experienced insomnia so she stopped it. She states she feels what bothers her most is lack of energy and difficulty concentrating especially during school. She had recent labs which were all stable. She reports that she has noticed some hand cramping especially during exercise. She has no other specific concerns for today's visit.       Review of Systems   Constitutional:  Negative for chills, fatigue and fever.   HENT:  Negative for congestion, ear pain and sore throat.    Eyes:  Negative for pain.   Respiratory:  Negative for cough and shortness of breath.    Cardiovascular:  Negative for chest pain and palpitations.   Gastrointestinal:  Negative for abdominal pain, nausea and vomiting.   Genitourinary:  Negative for dysuria.   Musculoskeletal:  Negative for arthralgias and back pain.   Skin:  Negative for rash.   Neurological:  Negative for dizziness and headaches.  "  Psychiatric/Behavioral:  Positive for decreased concentration and dysphoric mood.        Objective   /72 (BP Location: Left arm, Patient Position: Sitting, Cuff Size: Standard)   Pulse 101   Temp 98.5 °F (36.9 °C) (Tympanic)   Resp 16   Ht 5' 4\" (1.626 m)   Wt 70.3 kg (155 lb)   LMP 02/02/2025 (Exact Date)   SpO2 99%   BMI 26.61 kg/m²      Physical Exam  Vitals and nursing note reviewed.   Constitutional:       General: She is not in acute distress.     Appearance: She is well-developed.   HENT:      Head: Normocephalic and atraumatic.   Eyes:      Conjunctiva/sclera: Conjunctivae normal.   Cardiovascular:      Rate and Rhythm: Normal rate and regular rhythm.      Heart sounds: No murmur heard.  Pulmonary:      Effort: Pulmonary effort is normal. No respiratory distress.      Breath sounds: Normal breath sounds.   Musculoskeletal:         General: No swelling.      Cervical back: Neck supple.   Skin:     General: Skin is warm and dry.   Neurological:      Mental Status: She is alert.   Psychiatric:         Mood and Affect: Mood normal.         "

## 2025-02-28 NOTE — TELEPHONE ENCOUNTER
Patient called in she states she went to to the pharmacy to    buPROPion (WELLBUTRIN XL) 150 mg 24 hr tablet warm transfer to the [facility] will resend script Thank you

## 2025-02-28 NOTE — ASSESSMENT & PLAN NOTE
- Stable on Wellbutrin. Continue same.  - Will continue to monitor symptoms.  - Follow up in 1 month.

## 2025-04-11 ENCOUNTER — TELEPHONE (OUTPATIENT)
Dept: OTHER | Facility: OTHER | Age: 25
End: 2025-04-11

## 2025-04-11 NOTE — TELEPHONE ENCOUNTER
Patient is calling regarding cancelling an appointment.    Date/Time: 04/11 @245    Patient was rescheduled: YES [x] NO []    Patient requesting call back to reschedule: YES [] NO [x]

## 2025-05-02 ENCOUNTER — TELEPHONE (OUTPATIENT)
Dept: OTHER | Facility: OTHER | Age: 25
End: 2025-05-02

## 2025-05-02 NOTE — TELEPHONE ENCOUNTER
Patient is calling regarding cancelling an appointment.    Date/Time: 5/2/25 at 10:30 AM with Dr. Sanchez    Patient was rescheduled: YES [x] NO []    Appointment rescheduled for 5/29/25 at 2:45 PM    Patient requesting call back to reschedule: YES [] NO [x]

## 2025-05-05 ENCOUNTER — RA CDI HCC (OUTPATIENT)
Dept: OTHER | Facility: HOSPITAL | Age: 25
End: 2025-05-05

## 2025-05-05 NOTE — PROGRESS NOTES
HCC coding opportunities       Chart reviewed, no opportunity found: CHART REVIEWED, NO OPPORTUNITY FOUND        Patients Insurance        Commercial Insurance: [employer]        coffee

## 2025-05-09 ENCOUNTER — OFFICE VISIT (OUTPATIENT)
Dept: FAMILY MEDICINE CLINIC | Facility: CLINIC | Age: 25
End: 2025-05-09
Payer: COMMERCIAL

## 2025-05-09 VITALS
OXYGEN SATURATION: 97 % | SYSTOLIC BLOOD PRESSURE: 126 MMHG | HEIGHT: 64 IN | DIASTOLIC BLOOD PRESSURE: 70 MMHG | TEMPERATURE: 99.6 F | RESPIRATION RATE: 16 BRPM | BODY MASS INDEX: 27.66 KG/M2 | WEIGHT: 162 LBS | HEART RATE: 83 BPM

## 2025-05-09 DIAGNOSIS — R41.840 ATTENTION OR CONCENTRATION DEFICIT: ICD-10-CM

## 2025-05-09 DIAGNOSIS — F32.2 SEVERE MAJOR DEPRESSIVE DISORDER (HCC): Primary | ICD-10-CM

## 2025-05-09 PROCEDURE — 99213 OFFICE O/P EST LOW 20 MIN: CPT | Performed by: FAMILY MEDICINE

## 2025-05-09 RX ORDER — BUPROPION HYDROCHLORIDE 300 MG/1
300 TABLET ORAL DAILY
Qty: 90 TABLET | Refills: 1 | Status: SHIPPED | OUTPATIENT
Start: 2025-05-09

## 2025-05-09 RX ORDER — DEXTROAMPHETAMINE SACCHARATE, AMPHETAMINE ASPARTATE MONOHYDRATE, DEXTROAMPHETAMINE SULFATE AND AMPHETAMINE SULFATE 2.5; 2.5; 2.5; 2.5 MG/1; MG/1; MG/1; MG/1
10 CAPSULE, EXTENDED RELEASE ORAL EVERY MORNING
Qty: 30 CAPSULE | Refills: 0 | Status: SHIPPED | OUTPATIENT
Start: 2025-05-09

## 2025-05-09 NOTE — ASSESSMENT & PLAN NOTE
- Inadequately controlled. Will continue to monitor.  - Increase Wellbutrin dose from 150 mg PO daily to 300 mg PO daily.   - Plan 1 month f/u.     Orders:  •  buPROPion (WELLBUTRIN XL) 300 mg 24 hr tablet; Take 1 tablet (300 mg total) by mouth daily
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
## 2025-05-09 NOTE — ASSESSMENT & PLAN NOTE
- Restart Adderall XR 10 mg PO daily.  - Plan 1 month f/u.     Orders:  •  amphetamine-dextroamphetamine (ADDERALL XR, 10MG,) 10 MG 24 hr capsule; Take 1 capsule (10 mg total) by mouth every morning Max Daily Amount: 10 mg